# Patient Record
Sex: MALE | Race: WHITE | NOT HISPANIC OR LATINO | Employment: FULL TIME | URBAN - METROPOLITAN AREA
[De-identification: names, ages, dates, MRNs, and addresses within clinical notes are randomized per-mention and may not be internally consistent; named-entity substitution may affect disease eponyms.]

---

## 2017-08-01 ENCOUNTER — GENERIC CONVERSION - ENCOUNTER (OUTPATIENT)
Dept: OTHER | Facility: OTHER | Age: 52
End: 2017-08-01

## 2017-08-01 DIAGNOSIS — Z12.5 ENCOUNTER FOR SCREENING FOR MALIGNANT NEOPLASM OF PROSTATE: ICD-10-CM

## 2017-08-01 DIAGNOSIS — E78.5 HYPERLIPIDEMIA: ICD-10-CM

## 2017-08-01 DIAGNOSIS — Z11.59 ENCOUNTER FOR SCREENING FOR OTHER VIRAL DISEASES: ICD-10-CM

## 2017-08-01 DIAGNOSIS — Z12.11 ENCOUNTER FOR SCREENING FOR MALIGNANT NEOPLASM OF COLON: ICD-10-CM

## 2017-08-28 ENCOUNTER — LAB CONVERSION - ENCOUNTER (OUTPATIENT)
Dept: OTHER | Facility: OTHER | Age: 52
End: 2017-08-28

## 2017-08-28 ENCOUNTER — GENERIC CONVERSION - ENCOUNTER (OUTPATIENT)
Dept: OTHER | Facility: OTHER | Age: 52
End: 2017-08-28

## 2017-08-28 LAB
A/G RATIO (HISTORICAL): 2.2 (CALC) (ref 1–2.5)
ALBUMIN SERPL BCP-MCNC: 4.6 G/DL (ref 3.6–5.1)
ALP SERPL-CCNC: 81 U/L (ref 40–115)
ALT SERPL W P-5'-P-CCNC: 13 U/L (ref 9–46)
AST SERPL W P-5'-P-CCNC: 14 U/L (ref 10–35)
BASOPHILS # BLD AUTO: 0.8 %
BASOPHILS # BLD AUTO: 59 CELLS/UL (ref 0–200)
BILIRUB SERPL-MCNC: 1 MG/DL (ref 0.2–1.2)
BUN SERPL-MCNC: 17 MG/DL (ref 7–25)
BUN/CREA RATIO (HISTORICAL): NORMAL (CALC) (ref 6–22)
CALCIUM SERPL-MCNC: 9.6 MG/DL (ref 8.6–10.3)
CHLORIDE SERPL-SCNC: 105 MMOL/L (ref 98–110)
CHOLEST SERPL-MCNC: 191 MG/DL
CHOLEST/HDLC SERPL: 5.5 (CALC)
CO2 SERPL-SCNC: 25 MMOL/L (ref 20–31)
CREAT SERPL-MCNC: 0.84 MG/DL (ref 0.7–1.33)
DEPRECATED RDW RBC AUTO: 17.7 % (ref 11–15)
EGFR AFRICAN AMERICAN (HISTORICAL): 117 ML/MIN/1.73M2
EGFR-AMERICAN CALC (HISTORICAL): 101 ML/MIN/1.73M2
EOSINOPHIL # BLD AUTO: 222 CELLS/UL (ref 15–500)
EOSINOPHIL # BLD AUTO: 3 %
GAMMA GLOBULIN (HISTORICAL): 2.1 G/DL (CALC) (ref 1.9–3.7)
GLUCOSE (HISTORICAL): 97 MG/DL (ref 65–99)
HCT VFR BLD AUTO: 40.9 % (ref 38.5–50)
HDLC SERPL-MCNC: 35 MG/DL
HEPATITIS C ANTIBODY (HISTORICAL): NORMAL
HGB BLD-MCNC: 12.1 G/DL (ref 13.2–17.1)
LDL CHOLESTEROL (HISTORICAL): 136 MG/DL (CALC)
LYMPHOCYTES # BLD AUTO: 1806 CELLS/UL (ref 850–3900)
LYMPHOCYTES # BLD AUTO: 24.4 %
MCH RBC QN AUTO: 19.7 PG (ref 27–33)
MCHC RBC AUTO-ENTMCNC: 29.6 G/DL (ref 32–36)
MCV RBC AUTO: 66.6 FL (ref 80–100)
MONOCYTES # BLD AUTO: 570 CELLS/UL (ref 200–950)
MONOCYTES (HISTORICAL): 7.7 %
NEUTROPHILS # BLD AUTO: 4743 CELLS/UL (ref 1500–7800)
NEUTROPHILS # BLD AUTO: 64.1 %
NON-HDL-CHOL (CHOL-HDL) (HISTORICAL): 156 MG/DL (CALC)
PLATELET # BLD AUTO: 361 THOUSAND/UL (ref 140–400)
PMV BLD AUTO: 12.5 FL (ref 7.5–12.5)
POTASSIUM SERPL-SCNC: 4.6 MMOL/L (ref 3.5–5.3)
PROSTATE SPECIFIC ANTIGEN TOTAL (HISTORICAL): 1.6 NG/ML
RBC # BLD AUTO: 6.14 MILLION/UL (ref 4.2–5.8)
RBC MORPHOLOGY (HISTORICAL): ABNORMAL
SIGNAL TO CUT-OFF (HISTORICAL): 0.01
SODIUM SERPL-SCNC: 140 MMOL/L (ref 135–146)
TOTAL PROTEIN (HISTORICAL): 6.7 G/DL (ref 6.1–8.1)
TRIGL SERPL-MCNC: 95 MG/DL
TSH SERPL DL<=0.05 MIU/L-ACNC: 1.73 MIU/L (ref 0.4–4.5)
WBC # BLD AUTO: 7.4 THOUSAND/UL (ref 3.8–10.8)

## 2017-09-01 ENCOUNTER — ALLSCRIPTS OFFICE VISIT (OUTPATIENT)
Dept: OTHER | Facility: OTHER | Age: 52
End: 2017-09-01

## 2018-01-14 NOTE — RESULT NOTES
Discussion/Summary   will review at upcoming appt, hemoglobin slightly low, but better than in past     Verified Results  (1) CBC/PLT/DIFF [id number] 08:07AM Michelle Keen     Test Name Result Flag Reference   WHITE BLOOD CELL COUNT 7 4 Thousand/uL  3 8-10 8   RED BLOOD CELL COUNT 6 14 Million/uL H 4 20-5 80   HEMOGLOBIN 12 1 g/dL L 13 2-17 1   HEMATOCRIT 40 9 %  38 5-50 0   MCV 66 6 fL L 80 0-100 0   MCH 19 7 pg L 27 0-33 0   MCHC 29 6 g/dL L 32 0-36 0   RDW 17 7 % H 11 0-15 0   PLATELET COUNT 343 Thousand/uL  140-400   ABSOLUTE NEUTROPHILS 4743 cells/uL  4454-1607   ABSOLUTE LYMPHOCYTES 1806 cells/uL  850-3900   ABSOLUTE MONOCYTES 570 cells/uL  200-950   ABSOLUTE EOSINOPHILS 222 cells/uL     ABSOLUTE BASOPHILS 59 cells/uL  0-200   NEUTROPHILS 64 1 %     LYMPHOCYTES 24 4 %     MONOCYTES 7 7 %     EOSINOPHILS 3 0 %     BASOPHILS 0 8 %     CBC MORPHOLOGY   NORMAL   Microcytosis 2 +  Hypochromasia 1 +   MPV 12 5 fL  7 5-12 5     (1) COMPREHENSIVE METABOLIC PANEL [id number] 42:99ER Michelle Keen     Test Name Result Flag Reference   GLUCOSE 97 mg/dL  65-99   Fasting reference interval   UREA NITROGEN (BUN) 17 mg/dL  7-25   CREATININE 0 84 mg/dL  0 70-1 33   For patients >52years of age, the reference limit  for Creatinine is approximately 13% higher for people  identified as -American  eGFR NON-AFR   AMERICAN 101 mL/min/1 73m2  > OR = 60   eGFR AFRICAN AMERICAN 117 mL/min/1 73m2  > OR = 60   BUN/CREATININE RATIO   9-42   NOT APPLICABLE (calc)   SODIUM 140 mmol/L  135-146   POTASSIUM 4 6 mmol/L  3 5-5 3   CHLORIDE 105 mmol/L     CARBON DIOXIDE 25 mmol/L  20-31   CALCIUM 9 6 mg/dL  8 6-10 3   PROTEIN, TOTAL 6 7 g/dL  6 1-8 1   ALBUMIN 4 6 g/dL  3 6-5 1   GLOBULIN 2 1 g/dL (calc)  1 9-3 7   ALBUMIN/GLOBULIN RATIO 2 2 (calc)  1 0-2 5   BILIRUBIN, TOTAL 1 0 mg/dL  0 2-1 2   ALKALINE PHOSPHATASE 81 U/L     AST 14 U/L  10-35   ALT 13 U/L  9-46     (1) LIPID PANEL, FASTING [id number] 08:07AM Piper Malcolm     Test Name Result Flag Reference   CHOLESTEROL, TOTAL 191 mg/dL  <200   HDL CHOLESTEROL 35 mg/dL L >59   TRIGLICERIDES 95 mg/dL  <222   LDL-CHOLESTEROL 136 mg/dL (calc) H    Reference range: <100     Desirable range <100 mg/dL for patients with CHD or  diabetes and <70 mg/dL for diabetic patients with  known heart disease  The Pattison-Kerr calculation is a validated novel   method that provides better accuracy than the   Friedewald equation in the estimation of LDL-C,   particularly when TG levels are 150-400 mg/dL and   LDL-C levels are lower than 70 mg/dL  Reference:  Mary Ann Choudhury et al  Comparison of a Novel   Method vs the Friedewald Equation for Estimating   Low-Density Lipoprotein Cholesterol Levels From the   Standard Lipid Profile  GERRI  4376;045(29): 8619-1679  For additional information, please refer to  [email or web address]  (This link is being provided for informational/  educational purposes only )   CHOL/HDLC RATIO 5 5 (calc) H <5 0   NON HDL CHOLESTEROL 156 mg/dL (calc) H <130   For patients with diabetes plus 1 major ASCVD risk   factor, treating to a non-HDL-C goal of <100 mg/dL   (LDL-C of <70 mg/dL) is considered a therapeutic   option  (1) PSA (SCREEN) (Dx V76 44 Screen for Prostate Cancer) [id number] 08:07AM Piper Malcolm   REPORT COMMENT:  FASTING:YES  COLLECTION KIT GIVEN TO PATIENT  PATIENT ADVISED TO RETURN  Test Name Result Flag Reference   PSA, TOTAL 1 6 ng/mL  < OR = 4 0   The total PSA value from this assay system is   standardized against the WHO standard  The test   result will be approximately 20% lower when compared   to the equimolar-standardized total PSA (Mac   Minna)  Comparison of serial PSA results should be   interpreted with this fact in mind  This test was performed using the Siemens   chemiluminescent method  Values obtained from   different assay methods cannot be used  interchangeably   PSA levels, regardless of  value, should not be interpreted as absolute  evidence of the presence or absence of disease       (Q) HEPATITIS C ANTIBODY [id number] 08:07AM [facility] Smoker     Test Name Result Flag Reference   HEPATITIS C ANTIBODY NON-REACTIVE  NON-REACTIVE   SIGNAL TO CUT-OFF 0 01  <1 00     (Q) TSH, 3RD GENERATION [id number] 08:07AM [facility] Smoker     Test Name Result Flag Reference   TSH 1 73 mIU/L  0 40-4 50 Teddy

## 2018-01-15 NOTE — PROGRESS NOTES
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)   2  Encounter for preventive health examination (V70 0) (Z00 00)    Plan  Encounter for prostate cancer screening    · (1) PSA (SCREEN) (Dx V76 44 Screen for Prostate Cancer); Status:Active; Requested  for:[id number];   Hyperlipidemia    · (1) CBC/PLT/DIFF; Status:Active; Requested for:24Bmb0974;    · (1) COMPREHENSIVE METABOLIC PANEL; Status:Active; Requested for:60Wwe1658;    · (1) LIPID PANEL, FASTING; Status:Active; Requested for:[id number];    · (1) TSH; Status:Active; Requested for:52Kmv1048;   Screen for colon cancer    · *1 -  [facility]  *  Status:  Active  Requested for: 78Blh1770  Care Summary provided  : Yes    Discussion/Summary  health maintenance visit Impression: healthy adult male  Currently, he eats a healthy diet and has an adequate exercise regimen  Prostate cancer screening: the risks and benefits of prostate cancer screening were discussed and MARIANO done today  Testicular cancer screening: the risks and benefits of testicular cancer screening were discussed and clinical testicular exam was done today  Colorectal cancer screening: the risks and benefits of colorectal cancer screening were discussed and colonoscopy has been ordered  The risks and benefits of immunizations were discussed  Advice and education were given regarding nutrition, self skin examination, aerobic exercise, helmet use, weight bearing exercise, seat belt use and sunscreen use  History of Present Illness  HM, Adult Male: The patient is being seen for a health maintenance evaluation  General Health: The patient's health since the last visit is described as good  He has regular dental visits  He denies vision problems  He denies hearing loss  Lifestyle:  He consumes a diverse and healthy diet  He exercises regularly  He does not use tobacco  He consumes alcohol  Reproductive health:  the patient is sexually active   He denies erectile dysfunction  Screening:      Review of Systems    Constitutional: no fever, no chills and not feeling tired  Cardiovascular: the heart rate was not slow, no chest pain, the heart rate was not fast, no palpitations and no extremity edema  Respiratory: no shortness of breath, no cough, no wheezing and no shortness of breath during exertion  Gastrointestinal: no constipation and no diarrhea  Genitourinary: no dysuria  Psychiatric: no anxiety and no depression  Active Problems    1  Abnormal blood chemistry (790 6) (R79 9)   2  Anemia (285 9) (D64 9)   3  Encounter for prostate cancer screening (V76 44) (Z12 5)   4  Hyperlipidemia (272 4) (E78 5)   5  Myalgia And Myositis (729 1)   6  Need for hepatitis C screening test (V73 89) (Z11 59)   7  Nocturia (788 43) (R35 1)   8  Pigmented nevus (216 9) (D22 9)   9  Screen for colon cancer (V76 51) (Z12 11)   10  Shingles (053 9) (B02 9)    Surgical History    · History of Appendectomy   · History of Tonsillectomy With Adenoidectomy    Family History  Mother    · Family history of Hyperlipidemia   · Family history of Hypertension (V17 49)  Father    · Family history of Hyperlipidemia    Social History    · Being A Social Drinker   · Denied: History of Drug Use   · Former smoker (V15 82) (B46 047)    Current Meds   1  Aspirin 81 MG TABS; TAKE 1 TABLET DAILY; Therapy: (Recorded:01Jan2013) to Recorded   2  Buffered Vitamin C CAPS; Therapy: (Recorded:01Jan2013) to Recorded   3  Daily Multiple Vitamins Oral Tablet; TAKE 1 TABLET DAILY; Therapy: (Recorded:01Jan2013) to Recorded   4  Fish Oil 1000 MG Oral Capsule; TAKE 1 CAPSULE DAILY; Therapy: (Recorded:01Jan2013) to Recorded   5  Glucosamine Chondroitin Complx Oral Tablet; TAKE 1 TABLET DAILY AS DIRECTED; Therapy: [id number] to Recorded   6  Vitamin B Complex CAPS; Therapy: (Recorded:01Jan2013) to Recorded    Allergies    1  No Known Drug Allergies    2  No Known Environmental Allergies   3   No Known Food Allergies    Vitals   Recorded: 38Ljj4455 08:07AM   Heart Rate 70   Respiration 14   Systolic 287   Diastolic 86   Height 5 ft 7 5 in   Weight 208 lb    BMI Calculated 32 1   BSA Calculated 2 07     Physical Exam    Constitutional   General appearance: No acute distress, well appearing and well nourished  Head and Face   Head and face: Normal     Eyes   Conjunctiva and lids: No erythema, swelling or discharge  Ears, Nose, Mouth, and Throat   External inspection of ears and nose: Normal     Neck   Neck: Supple, symmetric, trachea midline, no masses  Thyroid: Normal, no thyromegaly  Pulmonary   Respiratory effort: No increased work of breathing or signs of respiratory distress  Cardiovascular   Auscultation of heart: Normal rate and rhythm, normal S1 and S2, no murmurs  Carotid pulses: 2+ bilaterally  Abdomen   Abdomen: Non-tender, no masses  Liver and spleen: No hepatomegaly or splenomegaly  Examination for hernias: No hernias appreciated  Anus, perineum, and rectum: Normal sphincter tone, no masses, no prolapse  Genitourinary   Scrotal contents: Normal testes, no masses  Penis: Normal, no lesions  Digital rectal exam of prostate: Normal size, no masses  Lymphatic   Palpation of lymph nodes in neck: No lymphadenopathy  Musculoskeletal   Gait and station: Normal     Neurologic   Cortical function: Normal mental status  Psychiatric   Judgment and insight: Normal     Orientation to person, place and time: Normal     Recent and remote memory: Intact      Mood and affect: Normal        Signatures   Electronically signed by : ANIBAL Garcia ; Sep  1 2017  8:20AM EST                       (Author)

## 2018-01-22 VITALS
SYSTOLIC BLOOD PRESSURE: 136 MMHG | WEIGHT: 208 LBS | HEART RATE: 70 BPM | RESPIRATION RATE: 14 BRPM | BODY MASS INDEX: 31.52 KG/M2 | DIASTOLIC BLOOD PRESSURE: 86 MMHG | HEIGHT: 68 IN

## 2018-08-27 DIAGNOSIS — Z12.5 ENCOUNTER FOR SCREENING FOR MALIGNANT NEOPLASM OF PROSTATE: ICD-10-CM

## 2018-08-27 DIAGNOSIS — E78.5 HYPERLIPIDEMIA: ICD-10-CM

## 2018-08-30 LAB
ALBUMIN SERPL-MCNC: 4.3 G/DL (ref 3.6–5.1)
ALBUMIN/GLOB SERPL: 2 (CALC) (ref 1–2.5)
ALP SERPL-CCNC: 80 U/L (ref 40–115)
ALT SERPL-CCNC: 14 U/L (ref 9–46)
AST SERPL-CCNC: 16 U/L (ref 10–35)
BASOPHILS # BLD AUTO: 78 CELLS/UL (ref 0–200)
BASOPHILS NFR BLD AUTO: 1.2 %
BILIRUB SERPL-MCNC: 1.1 MG/DL (ref 0.2–1.2)
BUN SERPL-MCNC: 16 MG/DL (ref 7–25)
BUN/CREAT SERPL: NORMAL (CALC) (ref 6–22)
CALCIUM SERPL-MCNC: 9.3 MG/DL (ref 8.6–10.3)
CHLORIDE SERPL-SCNC: 105 MMOL/L (ref 98–110)
CHOLEST SERPL-MCNC: 192 MG/DL
CHOLEST/HDLC SERPL: 4.9 (CALC)
CO2 SERPL-SCNC: 26 MMOL/L (ref 20–32)
CREAT SERPL-MCNC: 0.85 MG/DL (ref 0.7–1.33)
EOSINOPHIL # BLD AUTO: 241 CELLS/UL (ref 15–500)
EOSINOPHIL NFR BLD AUTO: 3.7 %
ERYTHROCYTE [DISTWIDTH] IN BLOOD BY AUTOMATED COUNT: 17.7 % (ref 11–15)
GLOBULIN SER CALC-MCNC: 2.2 G/DL (CALC) (ref 1.9–3.7)
GLUCOSE SERPL-MCNC: 96 MG/DL (ref 65–99)
HCT VFR BLD AUTO: 38.8 % (ref 38.5–50)
HDLC SERPL-MCNC: 39 MG/DL
HGB BLD-MCNC: 11.7 G/DL (ref 13.2–17.1)
LDLC SERPL CALC-MCNC: 133 MG/DL (CALC)
LYMPHOCYTES # BLD AUTO: 1846 CELLS/UL (ref 850–3900)
LYMPHOCYTES NFR BLD AUTO: 28.4 %
MCH RBC QN AUTO: 20.2 PG (ref 27–33)
MCHC RBC AUTO-ENTMCNC: 30.2 G/DL (ref 32–36)
MCV RBC AUTO: 67 FL (ref 80–100)
MONOCYTES # BLD AUTO: 429 CELLS/UL (ref 200–950)
MONOCYTES NFR BLD AUTO: 6.6 %
NEUTROPHILS # BLD AUTO: 3907 CELLS/UL (ref 1500–7800)
NEUTROPHILS NFR BLD AUTO: 60.1 %
NONHDLC SERPL-MCNC: 153 MG/DL (CALC)
PLATELET # BLD AUTO: 365 THOUSAND/UL (ref 140–400)
PMV BLD REES-ECKER: ABNORMAL FL
POTASSIUM SERPL-SCNC: 4.3 MMOL/L (ref 3.5–5.3)
PROT SERPL-MCNC: 6.5 G/DL (ref 6.1–8.1)
PSA SERPL-MCNC: 1.5 NG/ML
RBC # BLD AUTO: 5.79 MILLION/UL (ref 4.2–5.8)
SERVICE CMNT-IMP: ABNORMAL
SL AMB EGFR AFRICAN AMERICAN: 115 ML/MIN/1.73M2
SL AMB EGFR NON AFRICAN AMERICAN: 99 ML/MIN/1.73M2
SODIUM SERPL-SCNC: 138 MMOL/L (ref 135–146)
TRIGL SERPL-MCNC: 92 MG/DL
TSH SERPL-ACNC: 2.17 MIU/L (ref 0.4–4.5)
WBC # BLD AUTO: 6.5 THOUSAND/UL (ref 3.8–10.8)

## 2018-09-05 ENCOUNTER — OFFICE VISIT (OUTPATIENT)
Dept: INTERNAL MEDICINE CLINIC | Facility: CLINIC | Age: 53
End: 2018-09-05
Payer: COMMERCIAL

## 2018-09-05 VITALS
DIASTOLIC BLOOD PRESSURE: 84 MMHG | HEART RATE: 55 BPM | BODY MASS INDEX: 30.66 KG/M2 | HEIGHT: 69 IN | SYSTOLIC BLOOD PRESSURE: 130 MMHG | WEIGHT: 207 LBS | TEMPERATURE: 98.4 F | OXYGEN SATURATION: 98 %

## 2018-09-05 DIAGNOSIS — D56.9 THALASSEMIA, UNSPECIFIED TYPE: ICD-10-CM

## 2018-09-05 DIAGNOSIS — Z00.00 ENCOUNTER FOR WELLNESS EXAMINATION: Primary | ICD-10-CM

## 2018-09-05 DIAGNOSIS — Z23 NEED FOR SHINGLES VACCINE: ICD-10-CM

## 2018-09-05 DIAGNOSIS — Z12.11 SCREEN FOR COLON CANCER: ICD-10-CM

## 2018-09-05 PROCEDURE — 99396 PREV VISIT EST AGE 40-64: CPT | Performed by: INTERNAL MEDICINE

## 2018-09-05 RX ORDER — B-COMPLEX WITH VITAMIN C
TABLET ORAL
COMMUNITY
End: 2018-09-05 | Stop reason: ALTCHOICE

## 2018-09-05 RX ORDER — MULTIVITAMIN/IRON/FOLIC ACID 18MG-0.4MG
1 TABLET ORAL DAILY
COMMUNITY
Start: 2012-12-27

## 2018-09-05 RX ORDER — CHLORAL HYDRATE 500 MG
1 CAPSULE ORAL DAILY
COMMUNITY

## 2018-09-05 NOTE — PROGRESS NOTES
NAME: Ivette Abad  AGE: 48 y o  SEX: male  : 1965     DATE: 2018    Assessment and Plan     Problem List Items Addressed This Visit     Reuben Ledezma      Other Visit Diagnoses     Encounter for wellness examination    -  Primary     discussed preventative health, cancer screening, immunizations, and safety issues  Screen for colon cancer        Relevant Orders    Ambulatory referral to Colorectal Surgery    Need for shingles vaccine        Relevant Medications    Zoster Vac Recomb Adjuvanted ([address]) 50 MCG SUSR            · Patient Counseling:   · Nutrition: Stressed importance of a well balanced diet, moderation of sodium/saturated fat, caloric balance and sufficient intake of fiber  · Exercise: Stressed the importance of regular exercise with a goal of 150 minutes per week  · Dental Health: Discussed daily flossing and brushing and regular dental visits   · Injury Prevention: Discussed Safety Belts, Safety Helmets, and Smoke Detectors    · Immunizations reviewed and  Yearly flu shot recommended along with the new shingles shot  · Discussed benefits of screening   Including screening colonoscopy  · Discussed the patient's BMI with him  The BMI is above average; BMI management plan is completed     Return in about 1 year (around 2019)  Chief Complaint     Chief Complaint   Patient presents with    Annual Exam       History of Present Illness       Patient here for wellness exam, he feels well        Well Adult Physical   Patient here for a comprehensive physical exam       Diet and Physical Activity  Diet: well balanced diet and low carbohydrate diet  Weight concerns: Patient is overweight (BMI 25 0-29  9)  Exercise: frequently      Depression Screen  PHQ-9 Depression Screening    PHQ-9:    Frequency of the following problems over the past two weeks:       Little interest or pleasure in doing things:  0 - not at all  Feeling down, depressed, or hopeless:  0 - not at all  PHQ-2 Score:  0          General Health  Hearing: Normal:  bilateral  Vision: no vision problems  Dental: regular dental visits    Reproductive Health  No ED      The following portions of the patient's history were reviewed and updated as appropriate: allergies, current medications, past family history, past medical history, past social history, past surgical history and problem list     Review of Systems     Review of Systems   Constitutional: Negative for chills, fatigue and fever  HENT: Negative for congestion, nosebleeds, postnasal drip, sore throat and trouble swallowing  Eyes: Negative for pain  Respiratory: Negative for cough, chest tightness, shortness of breath and wheezing  Cardiovascular: Negative for chest pain, palpitations and leg swelling  Gastrointestinal: Negative for abdominal pain, constipation, diarrhea, nausea and vomiting  Endocrine: Negative for polydipsia and polyuria  Genitourinary: Negative for dysuria, flank pain and hematuria  Musculoskeletal: Negative for arthralgias  Skin: Negative for rash  Neurological: Negative for dizziness, tremors and headaches  Hematological: Does not bruise/bleed easily  Psychiatric/Behavioral: Negative for confusion and dysphoric mood  The patient is not nervous/anxious          Past Medical History     Past Medical History:   Diagnosis Date    Medical history unknown        Past Surgical History     Past Surgical History:   Procedure Laterality Date    APPENDECTOMY      TONSILLECTOMY AND ADENOIDECTOMY         Social History     Social History     Social History    Marital status: /Civil Union     Spouse name: N/A    Number of children: N/A    Years of education: N/A     Social History Main Topics    Smoking status: Not on file    Smokeless tobacco: Not on file    Alcohol use Yes      Comment: Being A Social Drinker     Drug use: No    Sexual activity: Not on file     Other Topics Concern    Not on file     Social History Narrative    No narrative on file       Family History     Family History   Problem Relation Age of Onset    Hyperlipidemia Mother     Hypertension Mother     Hyperlipidemia Father        Current Medications       Current Outpatient Prescriptions:     aspirin 81 MG tablet, Take 1 tablet by mouth daily, Disp: , Rfl:     Calcium Carbonate (CALTRATE 600 PO), Take by mouth, Disp: , Rfl:     DAILY MULTIPLE VITAMINS/IRON PO, Take 1 tablet by mouth daily, Disp: , Rfl:     Glucosamine-Chondroitin 0139-5053 MG/30ML LIQD, Take 1 tablet by mouth daily, Disp: , Rfl:     Omega-3 Fatty Acids (FISH OIL) 1,000 mg, Take 1 capsule by mouth daily, Disp: , Rfl:     Zoster Vac Recomb Adjuvanted (SHINGRIX) 50 MCG SUSR, Inject 50 mcg into a muscle once for 1 dose Repeat one dose in 2 to 6 months, Disp: 1 each, Rfl: 0     Allergies     No Known Allergies    Objective     /84   Pulse 55   Temp 98 4 °F (36 9 °C)   Ht 5' 9" (1 753 m)   Wt 93 9 kg (207 lb)   SpO2 98%   BMI 30 57 kg/m²      Physical Exam   Constitutional: He is oriented to person, place, and time  He appears well-developed and well-nourished  No distress  HENT:   Head: Normocephalic and atraumatic  Right Ear: External ear normal    Left Ear: External ear normal    Eyes: Conjunctivae are normal  No scleral icterus  Neck: Normal range of motion  Neck supple  No tracheal deviation present  No thyromegaly present  Cardiovascular: Normal rate, regular rhythm and normal heart sounds  No murmur heard  Pulmonary/Chest: Effort normal and breath sounds normal  No respiratory distress  He has no wheezes  He has no rales  Abdominal: Soft  Bowel sounds are normal  There is no tenderness  There is no rebound and no guarding  A hernia is present  Hernia confirmed positive in the right inguinal area ( mild)  Hernia confirmed negative in the left inguinal area     Genitourinary: Rectum normal, prostate normal and penis normal  Right testis shows no mass and no tenderness  Left testis shows no mass and no tenderness  Musculoskeletal: He exhibits no edema  Lymphadenopathy:     He has no cervical adenopathy  Neurological: He is alert and oriented to person, place, and time  Psychiatric: He has a normal mood and affect  His behavior is normal  Judgment and thought content normal    Vitals reviewed  No exam data present    Health Maintenance     Health Maintenance   Topic Date Due    CRC Screening: Colonoscopy  1965    DTaP,Tdap,and Td Vaccines (1 - Tdap) 01/31/1986    INFLUENZA VACCINE  10/05/2018 (Originally 9/1/2018)    Depression Screening PHQ  09/05/2019       There is no immunization history on file for this patient      Lexi Perry MD  [facility]

## 2019-08-09 ENCOUNTER — TELEPHONE (OUTPATIENT)
Dept: GASTROENTEROLOGY | Facility: MEDICAL CENTER | Age: 54
End: 2019-08-09

## 2019-08-09 ENCOUNTER — TELEPHONE (OUTPATIENT)
Dept: INTERNAL MEDICINE CLINIC | Facility: CLINIC | Age: 54
End: 2019-08-09

## 2019-08-09 DIAGNOSIS — Z12.5 SCREENING FOR PROSTATE CANCER: ICD-10-CM

## 2019-08-09 DIAGNOSIS — E78.00 HYPERCHOLESTEREMIA: Primary | ICD-10-CM

## 2019-08-09 DIAGNOSIS — E55.9 VITAMIN D DEFICIENCY: ICD-10-CM

## 2019-08-09 NOTE — TELEPHONE ENCOUNTER
Pt's wife called in requesting orders for labs to be done prior to his appt - also wants vitamin d, once entered please mail

## 2019-08-09 NOTE — TELEPHONE ENCOUNTER
08/09/19  Screened by: Elosie Mckeon    Referring Provider Dr Girish Short: There is no height or weight on file to calculate BMI  Has patient been referred for a routine screening Colonoscopy? yes  Is the patient between 39-70 years old? yes    SCHEDULING STAFF   If the patient is between 45yrs-49yrs, please advise patient to confirm benefits/coverage with their insurance company for a routine screening colonoscopy, some insurance carriers will only cover at [address] or older   If the patient is over 66years old, please schedule an office visit  Do you have any of the following symptoms?  no    Have you had a coronary or vascular stent within the last year? no    Have you had a heart attack or stroke in the last 6 months? no    Have you had intestinal surgery in the last 3 months? no    Do you have problems with: no    Do you use: no    Have you been hospitalized in the last Month? no    Have you been diagnosed with a bleeding disorder or anemia? no    Have you had chest pain (angina) or breathing problems  (COPD) in the last 3 months? no    Do you have any difficulty walking up a flight of stairs? no    Have you had Kidney failure or insufficiency? no    Have you had heart valve surgery? no    Are you confined to a wheelchair? no    Do you take no    Have you been diagnosed with Diabetes or are you taking any   Diabetic medications? no    : If patient answers NO to medical questions, then schedule procedure  If patient answers YES to medical questions, then schedule office appointment  Previous Colonoscopy no  Date and Facility of last colonoscopy? Comments: patient passed OA and would like to be scheduled in [facility]   Patient can be reached at [phone]

## 2019-08-12 ENCOUNTER — TELEPHONE (OUTPATIENT)
Dept: GASTROENTEROLOGY | Facility: AMBULARY SURGERY CENTER | Age: 54
End: 2019-08-12

## 2019-08-31 LAB
25(OH)D3 SERPL-MCNC: 43 NG/ML (ref 30–100)
ALBUMIN SERPL-MCNC: 4.4 G/DL (ref 3.6–5.1)
ALBUMIN/GLOB SERPL: 2 (CALC) (ref 1–2.5)
ALP SERPL-CCNC: 72 U/L (ref 40–115)
ALT SERPL-CCNC: 15 U/L (ref 9–46)
AST SERPL-CCNC: 15 U/L (ref 10–35)
BASOPHILS # BLD AUTO: 69 CELLS/UL (ref 0–200)
BASOPHILS NFR BLD AUTO: 1.1 %
BILIRUB SERPL-MCNC: 1 MG/DL (ref 0.2–1.2)
BUN SERPL-MCNC: 15 MG/DL (ref 7–25)
BUN/CREAT SERPL: NORMAL (CALC) (ref 6–22)
CALCIUM SERPL-MCNC: 9.7 MG/DL (ref 8.6–10.3)
CHLORIDE SERPL-SCNC: 103 MMOL/L (ref 98–110)
CHOLEST SERPL-MCNC: 188 MG/DL
CHOLEST/HDLC SERPL: 4.8 (CALC)
CO2 SERPL-SCNC: 28 MMOL/L (ref 20–32)
CREAT SERPL-MCNC: 0.99 MG/DL (ref 0.7–1.33)
EOSINOPHIL # BLD AUTO: 208 CELLS/UL (ref 15–500)
EOSINOPHIL NFR BLD AUTO: 3.3 %
ERYTHROCYTE [DISTWIDTH] IN BLOOD BY AUTOMATED COUNT: 16.3 % (ref 11–15)
GLOBULIN SER CALC-MCNC: 2.2 G/DL (CALC) (ref 1.9–3.7)
GLUCOSE SERPL-MCNC: 98 MG/DL (ref 65–99)
HCT VFR BLD AUTO: 38.4 % (ref 38.5–50)
HDLC SERPL-MCNC: 39 MG/DL
HGB BLD-MCNC: 11.8 G/DL (ref 13.2–17.1)
LDLC SERPL CALC-MCNC: 127 MG/DL (CALC)
LYMPHOCYTES # BLD AUTO: 1909 CELLS/UL (ref 850–3900)
LYMPHOCYTES NFR BLD AUTO: 30.3 %
MCH RBC QN AUTO: 20.7 PG (ref 27–33)
MCHC RBC AUTO-ENTMCNC: 30.7 G/DL (ref 32–36)
MCV RBC AUTO: 67.5 FL (ref 80–100)
MONOCYTES # BLD AUTO: 649 CELLS/UL (ref 200–950)
MONOCYTES NFR BLD AUTO: 10.3 %
NEUTROPHILS # BLD AUTO: 3465 CELLS/UL (ref 1500–7800)
NEUTROPHILS NFR BLD AUTO: 55 %
NONHDLC SERPL-MCNC: 149 MG/DL (CALC)
PLATELET # BLD AUTO: 334 THOUSAND/UL (ref 140–400)
PMV BLD REES-ECKER: 11.5 FL (ref 7.5–12.5)
POTASSIUM SERPL-SCNC: 4.5 MMOL/L (ref 3.5–5.3)
PROT SERPL-MCNC: 6.6 G/DL (ref 6.1–8.1)
PSA SERPL-MCNC: 1.5 NG/ML
RBC # BLD AUTO: 5.69 MILLION/UL (ref 4.2–5.8)
SL AMB EGFR AFRICAN AMERICAN: 100 ML/MIN/1.73M2
SL AMB EGFR NON AFRICAN AMERICAN: 86 ML/MIN/1.73M2
SODIUM SERPL-SCNC: 137 MMOL/L (ref 135–146)
TRIGL SERPL-MCNC: 112 MG/DL
WBC # BLD AUTO: 6.3 THOUSAND/UL (ref 3.8–10.8)

## 2019-09-06 ENCOUNTER — OFFICE VISIT (OUTPATIENT)
Dept: INTERNAL MEDICINE CLINIC | Facility: CLINIC | Age: 54
End: 2019-09-06
Payer: COMMERCIAL

## 2019-09-06 VITALS
TEMPERATURE: 98.3 F | BODY MASS INDEX: 31.55 KG/M2 | DIASTOLIC BLOOD PRESSURE: 78 MMHG | WEIGHT: 213 LBS | OXYGEN SATURATION: 96 % | HEIGHT: 69 IN | HEART RATE: 62 BPM | SYSTOLIC BLOOD PRESSURE: 112 MMHG

## 2019-09-06 DIAGNOSIS — Z00.00 WELLNESS EXAMINATION: Primary | ICD-10-CM

## 2019-09-06 DIAGNOSIS — D56.9 THALASSEMIA, UNSPECIFIED TYPE: ICD-10-CM

## 2019-09-06 DIAGNOSIS — Z23 NEED FOR DIPHTHERIA-TETANUS-PERTUSSIS (TDAP) VACCINE: ICD-10-CM

## 2019-09-06 PROCEDURE — 99396 PREV VISIT EST AGE 40-64: CPT | Performed by: INTERNAL MEDICINE

## 2019-09-06 PROCEDURE — 90471 IMMUNIZATION ADMIN: CPT

## 2019-09-06 PROCEDURE — 90715 TDAP VACCINE 7 YRS/> IM: CPT

## 2019-09-06 RX ORDER — VITAMIN B COMPLEX
1 CAPSULE ORAL DAILY
COMMUNITY

## 2019-09-06 NOTE — PROGRESS NOTES
Assessment/Plan:    Thalassanemia  Stable    Wellness examination  Discussed preventative health, cancer screening, immunizations, and safety issues  Patient reminded about need to get colonoscopy, he states he wants to wait till age 54, but may get it earlier down in [place] this year  I recommend Tdap vaccination  I recommend flu shot, and patient is going to get this at work  I recommend getting the Shingrix shot to help prevent Shingles  You can get it a pharmacy, and they can administer it there  It is a two shot series with the second shot needed between 2-6 months after the first shot  I would not recommend getting the shot before an important or fun event in case you were to have a reaction to the shot like a sore arm or flu-like symptoms  I make the same recommendation about any shot, as people can have a reaction to any shot  Diagnoses and all orders for this visit:    Wellness examination    Thalassemia, unspecified type    Need for diphtheria-tetanus-pertussis (Tdap) vaccine  -     TDAP VACCINE GREATER THAN OR EQUAL TO 6YO IM    Other orders  -     Cancel: Ambulatory referral to Colorectal Surgery; Future  -     Cancel: influenza vaccine, quadrivalent, 0 5 mL, preservative-free  -     Cancel: PNEUMOCOCCAL CONJUGATE VACCINE 13-VALENT GREATER THAN 6 MONTHS  -     b complex vitamins capsule; Take 1 capsule by mouth daily          Subjective:      Patient ID: Johnnie Barba is a 47 y o  male  Wellness:  No smoking cigarettes, sees a dentist regularly, exercises, eats healthy diet  The following portions of the patient's history were reviewed and updated as appropriate: allergies, current medications, past family history, past medical history, past social history, past surgical history and problem list     Review of Systems   Constitutional: Negative for chills, fatigue and fever  HENT: Negative for congestion, nosebleeds, postnasal drip, sore throat and trouble swallowing  Eyes: Negative for pain  Respiratory: Negative for cough, chest tightness, shortness of breath and wheezing  Cardiovascular: Negative for chest pain, palpitations and leg swelling  Gastrointestinal: Negative for abdominal pain, constipation, diarrhea, nausea and vomiting  Endocrine: Negative for polydipsia and polyuria  Genitourinary: Negative for dysuria, flank pain and hematuria  Musculoskeletal: Negative for arthralgias and myalgias  Skin: Negative for rash  Neurological: Negative for dizziness, tremors and headaches  Hematological: Does not bruise/bleed easily  Psychiatric/Behavioral: Negative for confusion and dysphoric mood  The patient is not nervous/anxious  Objective:      /78   Pulse 62   Temp 98 3 °F (36 8 °C)   Ht 5' 9" (1 753 m)   Wt 96 6 kg (213 lb)   SpO2 96%   BMI 31 45 kg/m²          Physical Exam   Constitutional: He is oriented to person, place, and time  He appears well-developed and well-nourished  No distress  HENT:   Head: Normocephalic and atraumatic  Right Ear: External ear normal    Left Ear: External ear normal    Eyes: Conjunctivae are normal  No scleral icterus  Neck: Normal range of motion  Neck supple  No tracheal deviation present  No thyromegaly present  Cardiovascular: Normal rate, regular rhythm and normal heart sounds  Pulmonary/Chest: Effort normal and breath sounds normal  No respiratory distress  He has no wheezes  He has no rales  Abdominal: Soft  Bowel sounds are normal  There is no tenderness  There is no rebound and no guarding  A hernia is present  Hernia confirmed positive in the right inguinal area (Very mild inguinal hernia)  Hernia confirmed negative in the left inguinal area  Genitourinary: Rectum normal, prostate normal and testes normal  Prostate is not enlarged  Right testis shows no mass and no tenderness  Left testis shows no mass and no tenderness  Musculoskeletal: He exhibits no edema  Lymphadenopathy:     He has no cervical adenopathy  Neurological: He is alert and oriented to person, place, and time  Psychiatric: He has a normal mood and affect  His behavior is normal  Judgment and thought content normal    Vitals reviewed  BMI Counseling: Body mass index is 31 45 kg/m²  Discussed the patient's BMI with him  The BMI is above average  BMI counseling and education was provided to the patient  Nutrition recommendations include reducing portion sizes, decreasing overall calorie intake, 3-5 servings of fruits/vegetables daily, reducing fast food intake, consuming healthier snacks, decreasing soda and/or juice intake, moderation in carbohydrate intake and increasing intake of lean protein  Exercise recommendations include exercising 3-5 times per week

## 2019-09-06 NOTE — ASSESSMENT & PLAN NOTE
Discussed preventative health, cancer screening, immunizations, and safety issues  Patient reminded about need to get colonoscopy, he states he wants to wait till age 54, but may get it earlier down in [place] this year  I recommend Tdap vaccination  I recommend flu shot, and patient is going to get this at work  I recommend getting the Shingrix shot to help prevent Shingles  You can get it a pharmacy, and they can administer it there  It is a two shot series with the second shot needed between 2-6 months after the first shot  I would not recommend getting the shot before an important or fun event in case you were to have a reaction to the shot like a sore arm or flu-like symptoms  I make the same recommendation about any shot, as people can have a reaction to any shot

## 2019-09-06 NOTE — PATIENT INSTRUCTIONS
Problem List Items Addressed This Visit        Other    Thalassanemia     Stable         Wellness examination - Primary     Discussed preventative health, cancer screening, immunizations, and safety issues  Patient reminded about need to get colonoscopy, he states he wants to wait till age 54, but may get it earlier down in [place] this year  I recommend Tdap vaccination  I recommend flu shot, and patient is going to get this at work  I recommend getting the Shingrix shot to help prevent Shingles  You can get it a pharmacy, and they can administer it there  It is a two shot series with the second shot needed between 2-6 months after the first shot  I would not recommend getting the shot before an important or fun event in case you were to have a reaction to the shot like a sore arm or flu-like symptoms  I make the same recommendation about any shot, as people can have a reaction to any shot             Other Visit Diagnoses     Need for diphtheria-tetanus-pertussis (Tdap) vaccine        Relevant Orders    TDAP VACCINE GREATER THAN OR EQUAL TO 8YO IM Pt presents with c/o conjunctivitis in both eyes.

## 2020-07-03 ENCOUNTER — TELEPHONE (OUTPATIENT)
Dept: INTERNAL MEDICINE CLINIC | Facility: CLINIC | Age: 55
End: 2020-07-03

## 2020-07-03 ENCOUNTER — TELEPHONE (OUTPATIENT)
Dept: OTHER | Facility: OTHER | Age: 55
End: 2020-07-03

## 2020-07-03 DIAGNOSIS — L23.7 POISON IVY DERMATITIS: Primary | ICD-10-CM

## 2020-07-03 RX ORDER — PREDNISONE 10 MG/1
TABLET ORAL
Qty: 18 TABLET | Refills: 0 | Status: SHIPPED | OUTPATIENT
Start: 2020-07-03 | End: 2020-07-10

## 2020-07-03 NOTE — TELEPHONE ENCOUNTER
Message as follow: Please call Argelia Linares @ [phone] regarding her  Torsten Camilo 1965/ Patient have poison ivy  Explain to pt   Wife if the on call do not call within 30 minutes to call back

## 2020-07-03 NOTE — TELEPHONE ENCOUNTER
Reports poison ivy  Is requesting prednisone and will call in at this time  Informed that if symptoms persist or worsen over weekend to go to urgent care

## 2020-08-08 ENCOUNTER — NURSE TRIAGE (OUTPATIENT)
Dept: OTHER | Facility: OTHER | Age: 55
End: 2020-08-08

## 2020-08-08 DIAGNOSIS — L23.7 POISON IVY DERMATITIS: Primary | ICD-10-CM

## 2020-08-08 RX ORDER — PREDNISONE 10 MG/1
TABLET ORAL
Qty: 21 TABLET | Refills: 0 | Status: SHIPPED | OUTPATIENT
Start: 2020-08-08 | End: 2020-08-17

## 2020-08-08 NOTE — TELEPHONE ENCOUNTER
As per Dr Adrian Chino he will send in steroid but he should follow up with PCP Monday if no improvement  If he notices worsening pain over the weekend then he should go to urgent care  Pt made aware and verbalized understanding

## 2020-08-08 NOTE — PROGRESS NOTES
Patient has outbreak of poison ivy dermatitis  Will send for prednisone at this time  Patient to followup with urgent care for evaluation if pain worsens over the weekend   Patient will call PCP Monday for followup if symptoms continue to persist

## 2020-08-08 NOTE — TELEPHONE ENCOUNTER
Regarding: Poison  ----- Message from Vilma Pinedo sent at 8/8/2020 10:22 AM EDT -----  Wife calling because  has poison on his arms and private part  Wife states this happens often and usually Dr Hardy Mohamud prescribes prednisone for this

## 2020-08-08 NOTE — TELEPHONE ENCOUNTER
Reason for Disposition   Severe poison ivy, oak, or sumac reaction in the past    Answer Assessment - Initial Assessment Questions  1  APPEARANCE of RASH: "Describe the rash "       It appears to be poison sumac  2  LOCATION: "Where is the rash located?"       Shin, wrist, hand, groin area, under the arm  3  SIZE: "How large is the rash?"       Medium patches  4  ONSET: "When did the rash begin?"       Started today  5  ITCHING: "Does the rash itch?" If so, ask: "How bad is it?"    - MILD - doesn't interfere with normal activities    - MODERATE-SEVERE: interferes with work, school, sleep, or other activities      The itching is moderate  He was doing yard work and was exposed  Has been having poison ivy reactions since he was a child      Protocols used: POISON IVY - OAK - SUMAC-ADULT-

## 2020-08-10 NOTE — TELEPHONE ENCOUNTER
Called patient and left message that medications was sent and he should follow up with his PCP if not improving

## 2020-08-12 ENCOUNTER — TELEMEDICINE (OUTPATIENT)
Dept: INTERNAL MEDICINE CLINIC | Facility: CLINIC | Age: 55
End: 2020-08-12
Payer: COMMERCIAL

## 2020-08-12 VITALS — HEIGHT: 69 IN | BODY MASS INDEX: 32.29 KG/M2 | WEIGHT: 218 LBS

## 2020-08-12 DIAGNOSIS — L23.7 POISON SUMAC: Primary | ICD-10-CM

## 2020-08-12 PROCEDURE — 3725F SCREEN DEPRESSION PERFORMED: CPT | Performed by: INTERNAL MEDICINE

## 2020-08-12 PROCEDURE — 1036F TOBACCO NON-USER: CPT | Performed by: INTERNAL MEDICINE

## 2020-08-12 PROCEDURE — 99214 OFFICE O/P EST MOD 30 MIN: CPT | Performed by: INTERNAL MEDICINE

## 2020-08-12 PROCEDURE — 3008F BODY MASS INDEX DOCD: CPT | Performed by: INTERNAL MEDICINE

## 2020-08-12 NOTE — PATIENT INSTRUCTIONS
Problem List Items Addressed This Visit        Musculoskeletal and Integument    Poison sumac - Primary     Patient still has areas of involvement, no signs of cellulitis  I discussed in detail with patient signs of cellulitis, what to look for, and to call me if these were to develop  He can add some topical hydrocortisone cream over-the-counter, continue with Benadryl evening, and Claritin during the day since it will make an drowsy  Discussed making sure her present is off tools, clothes, and cat    No respiratory distress or tongue swelling

## 2020-08-12 NOTE — PROGRESS NOTES
Virtual Regular Visit      Assessment/Plan:    Problem List Items Addressed This Visit        Musculoskeletal and Integument    Poison sumac - Primary     Patient still has areas of involvement, no signs of cellulitis  I discussed in detail with patient signs of cellulitis, what to look for, and to call me if these were to develop  He can add some topical hydrocortisone cream over-the-counter, continue with Benadryl evening, and Claritin during the day since it will make an drowsy  Discussed making sure her present is off tools, clothes, and cat  No respiratory distress or tongue swelling               BMI Counseling: There is no height or weight on file to calculate BMI  The BMI is above normal  Nutrition recommendations include encouraging healthy choices of fruits and vegetables and moderation in carbohydrate intake  Exercise recommendations include exercising 3-5 times per week  Reason for visit is   Chief Complaint   Patient presents with    Virtual Regular Visit        Encounter provider Rhoda Lehman MD    Provider located at [address] [place] [id number]      Recent Visits  No visits were found meeting these conditions  Showing recent visits within past 7 days and meeting all other requirements     Today's Visits  Date Type Provider Dept   08/12/20 Telemedicine Jose Hansen today's visits and meeting all other requirements     Future Appointments  No visits were found meeting these conditions  Showing future appointments within next 150 days and meeting all other requirements        The patient was identified by name and date of birth  Juana Robertsa was informed that this is a telemedicine visit and that the visit is being conducted through [facility] and patient was informed that this is a secure, HIPAA-compliant platform  He agrees to proceed     My office door was closed   No one else was in the room  He acknowledged consent and understanding of privacy and security of the video platform  The patient has agreed to participate and understands they can discontinue the visit at any time  Patient is aware this is a billable service  Subjective  Ciro Nieves is a 54 y o  male   Pt was treated with steroids with poison sumac  He reports it is continuing to "spread "  Pt did wash his clothes to make sure no resin on clothes  The involved areas itch, no pain  No swelling of the tongue, no wheezing  He has taken benadryl  Past Medical History:   Diagnosis Date    Medical history unknown        Past Surgical History:   Procedure Laterality Date    APPENDECTOMY      TONSILLECTOMY AND ADENOIDECTOMY         Current Outpatient Medications   Medication Sig Dispense Refill    b complex vitamins capsule Take 1 capsule by mouth daily      DAILY MULTIPLE VITAMINS/IRON PO Take 1 tablet by mouth daily      Glucosamine-Chondroitin 7612-8072 MG/30ML LIQD Take 1 tablet by mouth daily      Omega-3 Fatty Acids (FISH OIL) 1,000 mg Take 1 capsule by mouth daily      predniSONE 10 mg tablet Take 4 tablets (40 mg total) by mouth daily for 3 days, THEN 2 tablets (20 mg total) daily for 3 days, THEN 1 tablet (10 mg total) daily for 3 days  21 tablet 0    Calcium Carbonate (CALTRATE 600 PO) Take by mouth       No current facility-administered medications for this visit  No Known Allergies    Review of Systems   Constitutional: Negative for chills, fatigue and fever  HENT: Negative for congestion, nosebleeds, postnasal drip, sore throat and trouble swallowing  Eyes: Negative for pain  Respiratory: Negative for cough, chest tightness, shortness of breath and wheezing  Cardiovascular: Negative for chest pain, palpitations and leg swelling  Gastrointestinal: Negative for abdominal pain, constipation, diarrhea, nausea and vomiting     Endocrine: Negative for polydipsia and polyuria  Genitourinary: Negative for dysuria, flank pain and hematuria  Musculoskeletal: Negative for arthralgias  Skin: Positive for rash  Neurological: Negative for dizziness, tremors and headaches  Hematological: Does not bruise/bleed easily  Psychiatric/Behavioral: Negative for confusion and dysphoric mood  The patient is not nervous/anxious  Video Exam    Vitals:    08/12/20 0906   Weight: 98 9 kg (218 lb)   Height: 5' 9" (1 753 m)       Physical Exam  Constitutional:       General: He is not in acute distress  Appearance: He is well-developed  He is not diaphoretic  HENT:      Head: Normocephalic and atraumatic  Right Ear: External ear normal       Left Ear: External ear normal       Nose: Nose normal    Eyes:      General: No scleral icterus  Right eye: No discharge  Left eye: No discharge  Conjunctiva/sclera: Conjunctivae normal    Neck:      Musculoskeletal: Normal range of motion and neck supple  Trachea: No tracheal deviation  Pulmonary:      Effort: Pulmonary effort is normal  No respiratory distress  Abdominal:      Palpations: Abdomen is soft  Tenderness: There is no abdominal tenderness  Musculoskeletal: Normal range of motion  Skin:     Coloration: Skin is not pale  Findings: Rash (scattered patches of erythema on both arms, leg, no significant surrounding erythema) present  No erythema  Neurological:      Mental Status: He is alert and oriented to person, place, and time  Cranial Nerves: No cranial nerve deficit  Coordination: Coordination normal    Psychiatric:         Behavior: Behavior normal          Thought Content:  Thought content normal          Judgment: Judgment normal           I spent 25 minutes with patient today in which greater than 50% of the time was spent in counseling/coordination of care regarding acute problem      VIRTUAL VISIT [facility] acknowledges that he has consented to an online visit or consultation  He understands that the online visit is based solely on information provided by him, and that, in the absence of a face-to-face physical evaluation by the physician, the diagnosis he receives is both limited and provisional in terms of accuracy and completeness  This is not intended to replace a full medical face-to-face evaluation by the physician  Daryl Bumpers understands and accepts these terms

## 2020-08-12 NOTE — ASSESSMENT & PLAN NOTE
Patient still has areas of involvement, no signs of cellulitis  I discussed in detail with patient signs of cellulitis, what to look for, and to call me if these were to develop  He can add some topical hydrocortisone cream over-the-counter, continue with Benadryl evening, and Claritin during the day since it will make an drowsy  Discussed making sure her present is off tools, clothes, and cat    No respiratory distress or tongue swelling

## 2020-08-24 DIAGNOSIS — E55.9 VITAMIN D DEFICIENCY: ICD-10-CM

## 2020-08-24 DIAGNOSIS — E78.2 MIXED HYPERLIPIDEMIA: Primary | ICD-10-CM

## 2020-08-24 DIAGNOSIS — Z12.5 SCREENING FOR PROSTATE CANCER: ICD-10-CM

## 2020-09-05 LAB
25(OH)D3 SERPL-MCNC: 43 NG/ML (ref 30–100)
ALBUMIN SERPL-MCNC: 4.4 G/DL (ref 3.6–5.1)
ALBUMIN/GLOB SERPL: 2.1 (CALC) (ref 1–2.5)
ALP SERPL-CCNC: 73 U/L (ref 35–144)
ALT SERPL-CCNC: 19 U/L (ref 9–46)
AST SERPL-CCNC: 16 U/L (ref 10–35)
BASOPHILS # BLD AUTO: 99 CELLS/UL (ref 0–200)
BASOPHILS NFR BLD AUTO: 1.4 %
BILIRUB SERPL-MCNC: 1 MG/DL (ref 0.2–1.2)
BUN SERPL-MCNC: 15 MG/DL (ref 7–25)
BUN/CREAT SERPL: ABNORMAL (CALC) (ref 6–22)
CALCIUM SERPL-MCNC: 9.8 MG/DL (ref 8.6–10.3)
CHLORIDE SERPL-SCNC: 106 MMOL/L (ref 98–110)
CHOLEST SERPL-MCNC: 210 MG/DL
CHOLEST/HDLC SERPL: 5.8 (CALC)
CO2 SERPL-SCNC: 28 MMOL/L (ref 20–32)
CREAT SERPL-MCNC: 0.96 MG/DL (ref 0.7–1.33)
EOSINOPHIL # BLD AUTO: 241 CELLS/UL (ref 15–500)
EOSINOPHIL NFR BLD AUTO: 3.4 %
ERYTHROCYTE [DISTWIDTH] IN BLOOD BY AUTOMATED COUNT: 17.5 % (ref 11–15)
GLOBULIN SER CALC-MCNC: 2.1 G/DL (CALC) (ref 1.9–3.7)
GLUCOSE SERPL-MCNC: 102 MG/DL (ref 65–99)
HCT VFR BLD AUTO: 39.9 % (ref 38.5–50)
HDLC SERPL-MCNC: 36 MG/DL
HGB BLD-MCNC: 12.1 G/DL (ref 13.2–17.1)
LDLC SERPL CALC-MCNC: 152 MG/DL (CALC)
LYMPHOCYTES # BLD AUTO: 2151 CELLS/UL (ref 850–3900)
LYMPHOCYTES NFR BLD AUTO: 30.3 %
MCH RBC QN AUTO: 20.2 PG (ref 27–33)
MCHC RBC AUTO-ENTMCNC: 30.3 G/DL (ref 32–36)
MCV RBC AUTO: 66.5 FL (ref 80–100)
MONOCYTES # BLD AUTO: 660 CELLS/UL (ref 200–950)
MONOCYTES NFR BLD AUTO: 9.3 %
NEUTROPHILS # BLD AUTO: 3948 CELLS/UL (ref 1500–7800)
NEUTROPHILS NFR BLD AUTO: 55.6 %
NONHDLC SERPL-MCNC: 174 MG/DL (CALC)
PLATELET # BLD AUTO: 386 THOUSAND/UL (ref 140–400)
PMV BLD REES-ECKER: 12.2 FL (ref 7.5–12.5)
POTASSIUM SERPL-SCNC: 4.8 MMOL/L (ref 3.5–5.3)
PROT SERPL-MCNC: 6.5 G/DL (ref 6.1–8.1)
PSA SERPL-MCNC: 1.5 NG/ML
RBC # BLD AUTO: 6 MILLION/UL (ref 4.2–5.8)
SL AMB EGFR AFRICAN AMERICAN: 103 ML/MIN/1.73M2
SL AMB EGFR NON AFRICAN AMERICAN: 89 ML/MIN/1.73M2
SODIUM SERPL-SCNC: 140 MMOL/L (ref 135–146)
TRIGL SERPL-MCNC: 104 MG/DL
TSH SERPL-ACNC: 1.64 MIU/L (ref 0.4–4.5)
WBC # BLD AUTO: 7.1 THOUSAND/UL (ref 3.8–10.8)

## 2021-07-02 ENCOUNTER — TELEPHONE (OUTPATIENT)
Dept: INTERNAL MEDICINE CLINIC | Facility: CLINIC | Age: 56
End: 2021-07-02

## 2021-07-02 NOTE — TELEPHONE ENCOUNTER
I will assume he has back pain  Treatment for pain this is recommended to start with Tylenol which is available over-the-counter, if no relief with Tylenol and can step up to topical Aspercreme which has lidocaine in it, and if no relief then topical Voltaren which is an anti inflammatory which is also available over-the-counter  Other anti-inflammatories would be Aleve or Motrin which should be taken as needed with food, and stopped when no longer needed  There is no difference between the prescription and the over-the-counter anti-inflammatories

## 2021-07-02 NOTE — TELEPHONE ENCOUNTER
Vandana Garcia (wife) called to adv that Jacinto suggested he contact you for a prescription for an anti inflammatory   They would like it sent to [facility]

## 2021-07-20 ENCOUNTER — TELEPHONE (OUTPATIENT)
Dept: INTERNAL MEDICINE CLINIC | Facility: CLINIC | Age: 56
End: 2021-07-20

## 2021-07-20 DIAGNOSIS — E55.9 VITAMIN D DEFICIENCY: ICD-10-CM

## 2021-07-20 DIAGNOSIS — Z13.0 ENCOUNTER FOR SCREENING FOR DISEASES OF THE BLOOD AND BLOOD-FORMING ORGANS AND CERTAIN DISORDERS INVOLVING THE IMMUNE MECHANISM: Primary | ICD-10-CM

## 2021-07-20 DIAGNOSIS — Z13.29 SCREENING FOR THYROID DISORDER: ICD-10-CM

## 2021-07-20 DIAGNOSIS — Z12.5 SCREENING FOR PROSTATE CANCER: ICD-10-CM

## 2021-07-20 DIAGNOSIS — Z13.1 SCREENING FOR DIABETES MELLITUS: ICD-10-CM

## 2021-07-20 DIAGNOSIS — Z13.220 SCREENING FOR HYPERCHOLESTEROLEMIA: ICD-10-CM

## 2021-07-20 NOTE — TELEPHONE ENCOUNTER
Patient scheduled his yearly wellness in September  Asking if you can order blood work to be done prior to the appointment  Also asking if an order can be entered for a screening colonoscopy          Please advise        **Aware you are out of the office and this can wait til you return**        Please mail orders to patient's Ras address:  [address] [facility], [address] [place] [address]

## 2021-09-07 ENCOUNTER — RA CDI HCC (OUTPATIENT)
Dept: OTHER | Facility: HOSPITAL | Age: 56
End: 2021-09-07

## 2021-09-07 NOTE — PROGRESS NOTES
[facility] 75  coding opportunities       Chart reviewed, no opportunity found: CHART REVIEWED, NO OPPORTUNITY FOUND                        Patients insurance company: [facility] [address]  (Medicare Advantage and Commercial)

## 2021-09-10 LAB
25(OH)D3 SERPL-MCNC: 33 NG/ML (ref 30–100)
ALBUMIN SERPL-MCNC: 4.3 G/DL (ref 3.6–5.1)
ALBUMIN/GLOB SERPL: 2 (CALC) (ref 1–2.5)
ALP SERPL-CCNC: 73 U/L (ref 35–144)
ALT SERPL-CCNC: 14 U/L (ref 9–46)
AST SERPL-CCNC: 14 U/L (ref 10–35)
BASOPHILS # BLD AUTO: 99 CELLS/UL (ref 0–200)
BASOPHILS NFR BLD AUTO: 1.1 %
BILIRUB SERPL-MCNC: 1.1 MG/DL (ref 0.2–1.2)
BUN SERPL-MCNC: 14 MG/DL (ref 7–25)
BUN/CREAT SERPL: NORMAL (CALC) (ref 6–22)
CALCIUM SERPL-MCNC: 9.6 MG/DL (ref 8.6–10.3)
CHLORIDE SERPL-SCNC: 105 MMOL/L (ref 98–110)
CHOLEST SERPL-MCNC: 187 MG/DL
CHOLEST/HDLC SERPL: 4.9 (CALC)
CO2 SERPL-SCNC: 27 MMOL/L (ref 20–32)
CREAT SERPL-MCNC: 0.87 MG/DL (ref 0.7–1.33)
EOSINOPHIL # BLD AUTO: 279 CELLS/UL (ref 15–500)
EOSINOPHIL NFR BLD AUTO: 3.1 %
ERYTHROCYTE [DISTWIDTH] IN BLOOD BY AUTOMATED COUNT: 17.1 % (ref 11–15)
GLOBULIN SER CALC-MCNC: 2.1 G/DL (CALC) (ref 1.9–3.7)
GLUCOSE SERPL-MCNC: 98 MG/DL (ref 65–99)
HCT VFR BLD AUTO: 36.1 % (ref 38.5–50)
HDLC SERPL-MCNC: 38 MG/DL
HGB BLD-MCNC: 11.4 G/DL (ref 13.2–17.1)
LDLC SERPL CALC-MCNC: 127 MG/DL (CALC)
LYMPHOCYTES # BLD AUTO: 2394 CELLS/UL (ref 850–3900)
LYMPHOCYTES NFR BLD AUTO: 26.6 %
MCH RBC QN AUTO: 20.2 PG (ref 27–33)
MCHC RBC AUTO-ENTMCNC: 31.6 G/DL (ref 32–36)
MCV RBC AUTO: 64.1 FL (ref 80–100)
MONOCYTES # BLD AUTO: 720 CELLS/UL (ref 200–950)
MONOCYTES NFR BLD AUTO: 8 %
NEUTROPHILS # BLD AUTO: 5508 CELLS/UL (ref 1500–7800)
NEUTROPHILS NFR BLD AUTO: 61.2 %
NONHDLC SERPL-MCNC: 149 MG/DL (CALC)
PLATELET # BLD AUTO: 396 THOUSAND/UL (ref 140–400)
POTASSIUM SERPL-SCNC: 4.4 MMOL/L (ref 3.5–5.3)
PROT SERPL-MCNC: 6.4 G/DL (ref 6.1–8.1)
PSA SERPL-MCNC: 1.4 NG/ML
RBC # BLD AUTO: 5.63 MILLION/UL (ref 4.2–5.8)
SL AMB EGFR AFRICAN AMERICAN: 112 ML/MIN/1.73M2
SL AMB EGFR NON AFRICAN AMERICAN: 96 ML/MIN/1.73M2
SODIUM SERPL-SCNC: 139 MMOL/L (ref 135–146)
TRIGL SERPL-MCNC: 114 MG/DL
TSH SERPL-ACNC: 2.93 MIU/L (ref 0.4–4.5)
WBC # BLD AUTO: 9 THOUSAND/UL (ref 3.8–10.8)

## 2021-09-13 ENCOUNTER — OFFICE VISIT (OUTPATIENT)
Dept: INTERNAL MEDICINE CLINIC | Facility: CLINIC | Age: 56
End: 2021-09-13
Payer: COMMERCIAL

## 2021-09-13 VITALS
WEIGHT: 212 LBS | OXYGEN SATURATION: 99 % | HEIGHT: 69 IN | BODY MASS INDEX: 31.4 KG/M2 | HEART RATE: 78 BPM | SYSTOLIC BLOOD PRESSURE: 110 MMHG | TEMPERATURE: 97.5 F | DIASTOLIC BLOOD PRESSURE: 70 MMHG

## 2021-09-13 DIAGNOSIS — D64.9 ANEMIA, UNSPECIFIED TYPE: ICD-10-CM

## 2021-09-13 DIAGNOSIS — D56.9 THALASSEMIA, UNSPECIFIED TYPE: ICD-10-CM

## 2021-09-13 DIAGNOSIS — Z00.00 WELLNESS EXAMINATION: Primary | ICD-10-CM

## 2021-09-13 PROCEDURE — 3008F BODY MASS INDEX DOCD: CPT | Performed by: INTERNAL MEDICINE

## 2021-09-13 PROCEDURE — 1036F TOBACCO NON-USER: CPT | Performed by: INTERNAL MEDICINE

## 2021-09-13 PROCEDURE — 3725F SCREEN DEPRESSION PERFORMED: CPT | Performed by: INTERNAL MEDICINE

## 2021-09-13 PROCEDURE — 99396 PREV VISIT EST AGE 40-64: CPT | Performed by: INTERNAL MEDICINE

## 2021-09-13 NOTE — ASSESSMENT & PLAN NOTE
Discussed preventative health, cancer screening, immunizations, and safety issues  Patient wanted to wait till age 54 to get his 1st colonoscopy, he did not get this yet  He wants to get in [place]  Patient had the COVID vaccines  I recommend yearly flu shot  Patient up-to-date with Tdap vaccination  I recommend getting the Shingrix shot to help prevent Shingles  You can get it a pharmacy, and they can administer it there  It is a two shot series with the second shot needed between 2-6 months after the first shot  I would not recommend getting the shot before an important or fun event in case you were to have a reaction to the shot like a sore arm or flu-like symptoms  I make the same recommendation about any shot, as people can have a reaction to any shot

## 2021-09-13 NOTE — PATIENT INSTRUCTIONS
Problem List Items Addressed This Visit        Other    Melony Calderon       Will check to make sure there is not any additional issue going on like mild iron deficiency         Wellness examination - Primary     Discussed preventative health, cancer screening, immunizations, and safety issues  Patient wanted to wait till age 54 to get his 1st colonoscopy, he did not get this yet  He wants to get in [place]  Patient had the COVID vaccines  I recommend yearly flu shot  Patient up-to-date with Tdap vaccination  I recommend getting the Shingrix shot to help prevent Shingles  You can get it a pharmacy, and they can administer it there  It is a two shot series with the second shot needed between 2-6 months after the first shot  I would not recommend getting the shot before an important or fun event in case you were to have a reaction to the shot like a sore arm or flu-like symptoms  I make the same recommendation about any shot, as people can have a reaction to any shot             Other Visit Diagnoses     Anemia, unspecified type        Relevant Orders    Iron, TIBC and Ferritin Panel    Occult Blood, Fecal Immunochemical

## 2021-09-13 NOTE — PROGRESS NOTES
Assessment/Plan:    Wellness examination  Discussed preventative health, cancer screening, immunizations, and safety issues  Patient wanted to wait till age 54 to get his 1st colonoscopy, he did not get this yet  He wants to get in [place]  Patient had the COVID vaccines  I recommend yearly flu shot  Patient up-to-date with Tdap vaccination  I recommend getting the Shingrix shot to help prevent Shingles  You can get it a pharmacy, and they can administer it there  It is a two shot series with the second shot needed between 2-6 months after the first shot  I would not recommend getting the shot before an important or fun event in case you were to have a reaction to the shot like a sore arm or flu-like symptoms  I make the same recommendation about any shot, as people can have a reaction to any shot  Kim Abraham    Will check to make sure there is not any additional issue going on like mild iron deficiency       Diagnoses and all orders for this visit:    Wellness examination    Anemia, unspecified type  -     Iron, TIBC and Ferritin Panel; Future  -     Occult Blood, Fecal Immunochemical; Future    Thalassemia, unspecified type        BMI Counseling: Body mass index is 31 31 kg/m²  The BMI is above normal  Nutrition recommendations include encouraging healthy choices of fruits and vegetables and moderation in carbohydrate intake  Exercise recommendations include exercising 3-5 times per week  Subjective:      Patient ID: Moshe Yu is a 64 y o  male  Wellness:  No smoking cigarettes, patient sees the dentist regularly, patient exercises and tries to eat healthy diet  The following portions of the patient's history were reviewed and updated as appropriate: allergies, current medications, past family history, past medical history, past social history, past surgical history and problem list     Review of Systems   Constitutional: Negative for chills, fatigue and fever     HENT: Negative for congestion, nosebleeds, postnasal drip, sore throat and trouble swallowing  Eyes: Negative for pain  Respiratory: Negative for cough, chest tightness, shortness of breath and wheezing  Cardiovascular: Negative for chest pain, palpitations and leg swelling  Gastrointestinal: Negative for abdominal pain, constipation, diarrhea, nausea and vomiting  Endocrine: Negative for polydipsia and polyuria  Genitourinary: Negative for dysuria, flank pain and hematuria  Musculoskeletal: Positive for back pain  Negative for arthralgias and myalgias  Skin: Negative for rash  Neurological: Negative for dizziness, tremors, light-headedness and headaches  Hematological: Does not bruise/bleed easily  Psychiatric/Behavioral: Negative for confusion and dysphoric mood  The patient is not nervous/anxious  Objective:      /70   Pulse 78   Temp 97 5 °F (36 4 °C) (Temporal)   Ht 5' 9" (1 753 m)   Wt 96 2 kg (212 lb)   SpO2 99%   BMI 31 31 kg/m²          Physical Exam  Vitals reviewed  Constitutional:       General: He is not in acute distress  Appearance: Normal appearance  He is well-developed  HENT:      Head: Normocephalic and atraumatic  Right Ear: External ear normal       Left Ear: External ear normal       Nose: Nose normal    Eyes:      General: No scleral icterus  Conjunctiva/sclera: Conjunctivae normal    Neck:      Thyroid: No thyromegaly  Trachea: No tracheal deviation  Cardiovascular:      Rate and Rhythm: Normal rate and regular rhythm  Heart sounds: Normal heart sounds  No murmur heard  Pulmonary:      Effort: Pulmonary effort is normal  No respiratory distress  Breath sounds: Normal breath sounds  No wheezing or rales  Abdominal:      General: Bowel sounds are normal       Palpations: Abdomen is soft  Tenderness: There is no abdominal tenderness  There is no guarding or rebound        Hernia: There is no hernia in the left inguinal area or right inguinal area  Genitourinary:     Testes: Normal    Musculoskeletal:      Cervical back: Normal range of motion and neck supple  Right lower leg: No edema  Left lower leg: No edema  Lymphadenopathy:      Cervical: No cervical adenopathy  Skin:     Coloration: Skin is not jaundiced or pale  Neurological:      General: No focal deficit present  Mental Status: He is alert and oriented to person, place, and time  Psychiatric:         Mood and Affect: Mood normal          Behavior: Behavior normal          Thought Content:  Thought content normal          Judgment: Judgment normal

## 2021-09-15 LAB
FERRITIN SERPL-MCNC: 247 NG/ML (ref 38–380)
IRON SATN MFR SERPL: 39 % (CALC) (ref 20–48)
IRON SERPL-MCNC: 134 MCG/DL (ref 50–180)
TIBC SERPL-MCNC: 340 MCG/DL (CALC) (ref 250–425)

## 2022-08-17 ENCOUNTER — TELEPHONE (OUTPATIENT)
Dept: INTERNAL MEDICINE CLINIC | Facility: CLINIC | Age: 57
End: 2022-08-17

## 2022-08-17 DIAGNOSIS — E78.00 HYPERCHOLESTEREMIA: ICD-10-CM

## 2022-08-17 DIAGNOSIS — D64.9 ANEMIA, UNSPECIFIED TYPE: Primary | ICD-10-CM

## 2022-08-17 DIAGNOSIS — Z12.5 SCREENING FOR PROSTATE CANCER: ICD-10-CM

## 2022-08-17 DIAGNOSIS — E55.9 VITAMIN D DEFICIENCY: ICD-10-CM

## 2022-08-17 NOTE — TELEPHONE ENCOUNTER
Patient is scheduled for a wellness and asking for blood work to be done prior to the appointment        Please advise      **Please mail script to [place] address**  [id number], [address]

## 2022-09-15 ENCOUNTER — RA CDI HCC (OUTPATIENT)
Dept: OTHER | Facility: HOSPITAL | Age: 57
End: 2022-09-15

## 2022-09-15 NOTE — PROGRESS NOTES
[facility] 75  coding opportunities       Chart reviewed, no opportunity found: CHART REVIEWED, NO OPPORTUNITY FOUND        Patients Insurance        Commercial Insurance: [employer]

## 2022-09-17 LAB
25(OH)D3 SERPL-MCNC: 38 NG/ML (ref 30–100)
ALBUMIN SERPL-MCNC: 4.6 G/DL (ref 3.6–5.1)
ALBUMIN/GLOB SERPL: 2.2 (CALC) (ref 1–2.5)
ALP SERPL-CCNC: 80 U/L (ref 35–144)
ALT SERPL-CCNC: 14 U/L (ref 9–46)
AST SERPL-CCNC: 15 U/L (ref 10–35)
BASOPHILS # BLD AUTO: 48 CELLS/UL (ref 0–200)
BASOPHILS NFR BLD AUTO: 0.7 %
BILIRUB SERPL-MCNC: 1.3 MG/DL (ref 0.2–1.2)
BUN SERPL-MCNC: 17 MG/DL (ref 7–25)
BUN/CREAT SERPL: ABNORMAL (CALC) (ref 6–22)
CALCIUM SERPL-MCNC: 10 MG/DL (ref 8.6–10.3)
CHLORIDE SERPL-SCNC: 104 MMOL/L (ref 98–110)
CHOLEST SERPL-MCNC: 210 MG/DL
CHOLEST/HDLC SERPL: 6 (CALC)
CO2 SERPL-SCNC: 25 MMOL/L (ref 20–32)
CREAT SERPL-MCNC: 1.05 MG/DL (ref 0.7–1.3)
EOSINOPHIL # BLD AUTO: 122 CELLS/UL (ref 15–500)
EOSINOPHIL NFR BLD AUTO: 1.8 %
ERYTHROCYTE [DISTWIDTH] IN BLOOD BY AUTOMATED COUNT: 16.9 % (ref 11–15)
GFR/BSA.PRED SERPLBLD CYS-BASED-ARV: 83 ML/MIN/1.73M2
GLOBULIN SER CALC-MCNC: 2.1 G/DL (CALC) (ref 1.9–3.7)
GLUCOSE SERPL-MCNC: 102 MG/DL (ref 65–99)
HCT VFR BLD AUTO: 39.5 % (ref 38.5–50)
HDLC SERPL-MCNC: 35 MG/DL
HGB BLD-MCNC: 12.2 G/DL (ref 13.2–17.1)
IRON SATN MFR SERPL: 44 % (CALC) (ref 20–48)
IRON SERPL-MCNC: 144 MCG/DL (ref 50–180)
LDLC SERPL CALC-MCNC: 153 MG/DL (CALC)
LYMPHOCYTES # BLD AUTO: 1673 CELLS/UL (ref 850–3900)
LYMPHOCYTES NFR BLD AUTO: 24.6 %
MCH RBC QN AUTO: 20.2 PG (ref 27–33)
MCHC RBC AUTO-ENTMCNC: 30.9 G/DL (ref 32–36)
MCV RBC AUTO: 65.5 FL (ref 80–100)
MONOCYTES # BLD AUTO: 585 CELLS/UL (ref 200–950)
MONOCYTES NFR BLD AUTO: 8.6 %
NEUTROPHILS # BLD AUTO: 4372 CELLS/UL (ref 1500–7800)
NEUTROPHILS NFR BLD AUTO: 64.3 %
NONHDLC SERPL-MCNC: 175 MG/DL (CALC)
PLATELET # BLD AUTO: 371 THOUSAND/UL (ref 140–400)
POTASSIUM SERPL-SCNC: 4.7 MMOL/L (ref 3.5–5.3)
PROT SERPL-MCNC: 6.7 G/DL (ref 6.1–8.1)
PSA SERPL-MCNC: 1.05 NG/ML
RBC # BLD AUTO: 6.03 MILLION/UL (ref 4.2–5.8)
SODIUM SERPL-SCNC: 138 MMOL/L (ref 135–146)
TIBC SERPL-MCNC: 328 MCG/DL (CALC) (ref 250–425)
TRIGL SERPL-MCNC: 102 MG/DL
TSH SERPL-ACNC: 2.4 MIU/L (ref 0.4–4.5)
WBC # BLD AUTO: 6.8 THOUSAND/UL (ref 3.8–10.8)

## 2022-09-21 ENCOUNTER — OFFICE VISIT (OUTPATIENT)
Dept: INTERNAL MEDICINE CLINIC | Facility: CLINIC | Age: 57
End: 2022-09-21
Payer: COMMERCIAL

## 2022-09-21 VITALS
OXYGEN SATURATION: 99 % | HEART RATE: 74 BPM | SYSTOLIC BLOOD PRESSURE: 108 MMHG | WEIGHT: 218 LBS | DIASTOLIC BLOOD PRESSURE: 70 MMHG | HEIGHT: 69 IN | BODY MASS INDEX: 32.29 KG/M2

## 2022-09-21 DIAGNOSIS — Z00.00 WELLNESS EXAMINATION: Primary | ICD-10-CM

## 2022-09-21 DIAGNOSIS — Z23 ENCOUNTER FOR IMMUNIZATION: ICD-10-CM

## 2022-09-21 DIAGNOSIS — Z11.4 SCREENING FOR HIV (HUMAN IMMUNODEFICIENCY VIRUS): ICD-10-CM

## 2022-09-21 DIAGNOSIS — D56.9 THALASSEMIA, UNSPECIFIED TYPE: ICD-10-CM

## 2022-09-21 PROBLEM — L23.7 POISON SUMAC: Status: RESOLVED | Noted: 2020-08-12 | Resolved: 2022-09-21

## 2022-09-21 PROCEDURE — 3725F SCREEN DEPRESSION PERFORMED: CPT | Performed by: INTERNAL MEDICINE

## 2022-09-21 PROCEDURE — 99396 PREV VISIT EST AGE 40-64: CPT | Performed by: INTERNAL MEDICINE

## 2022-09-21 NOTE — PROGRESS NOTES
Name: Gege Dobbins      : 1965      MRN: [id number]  Encounter Provider: Charley Hill MD  Encounter Date: 2022   [facility] [address]     1  Wellness examination  Assessment & Plan:   Discussed preventative health, cancer screening, immunizations, and safety issues  Patient has not had a colonoscopy yet, he wanted to wait till he was age 54  Patient reports he is going to get this done in [place], I asked that he have the results sent here so we can get the electronic health record updated  All labs reviewed with patient patient up-to-date with Tdap vaccination   I recommend getting the Shingrix shot to help prevent Shingles  You can get it a pharmacy, and they can administer it there  It is a two shot series with the second shot needed between 2-6 months after the first shot  I would not recommend getting the shot before an important or fun event in case you were to have a reaction to the shot like a sore arm or flu-like symptoms  I make the same recommendation about any shot, as people can have a reaction to any shot  2  Encounter for immunization    3  Thalassemia, unspecified type  Assessment & Plan:    Stable      4  Screening for HIV (human immunodeficiency virus)  -     HIV 1/2 Antigen/Antibody (4th Generation) w Reflex [facility]; Future      BMI Counseling: Body mass index is 32 19 kg/m²  The BMI is above normal  Nutrition recommendations include encouraging healthy choices of fruits and vegetables and moderation in carbohydrate intake  Exercise recommendations include exercising 3-5 times per week  Rationale for BMI follow-up plan is due to patient being overweight or obese  Depression Screening and Follow-up Plan: Patient was screened for depression during today's encounter  They screened negative with a PHQ-2 score of 0          Subjective     Wellness:  Patient eats a healthy diet, exercises, no smoking cigarettes, sees a dentist regularly    Review of Systems   Constitutional: Negative for chills, fatigue and fever  HENT: Negative for congestion, nosebleeds, postnasal drip, sore throat and trouble swallowing  Eyes: Negative for pain  Respiratory: Negative for cough, chest tightness, shortness of breath and wheezing  Cardiovascular: Negative for chest pain, palpitations and leg swelling  Gastrointestinal: Negative for abdominal pain, constipation, diarrhea, nausea and vomiting  Endocrine: Negative for polydipsia and polyuria  Genitourinary: Negative for dysuria, flank pain and hematuria  Musculoskeletal: Negative for arthralgias and myalgias  Skin: Negative for rash  Neurological: Negative for dizziness, tremors, light-headedness and headaches  Hematological: Does not bruise/bleed easily  Psychiatric/Behavioral: Negative for confusion and dysphoric mood  The patient is not nervous/anxious          Past Medical History:   Diagnosis Date    Medical history unknown     Poison sumac 8/12/2020     Past Surgical History:   Procedure Laterality Date    APPENDECTOMY      TONSILLECTOMY AND ADENOIDECTOMY       Family History   Problem Relation Age of Onset    Hyperlipidemia Mother     Hypertension Mother     Hyperlipidemia Father      Social History     Socioeconomic History    Marital status: /Civil Union     Spouse name: None    Number of children: None    Years of education: None    Highest education level: None   Occupational History    None   Tobacco Use    Smoking status: Never Smoker    Smokeless tobacco: Never Used   Substance and Sexual Activity    Alcohol use: Yes     Comment: Being A Social Drinker     Drug use: No    Sexual activity: None   Other Topics Concern    None   Social History Narrative    None     Social Determinants of Health     Financial Resource Strain: Not on file   Food Insecurity: Not on file   Transportation Needs: Not on file   Physical Activity: Not on file   Stress: Not on file   Social Connections: Not on file   Intimate Partner Violence: Not on file   Housing Stability: Not on file     Current Outpatient Medications on File Prior to Visit   Medication Sig    b complex vitamins capsule Take 1 capsule by mouth daily    DAILY MULTIPLE VITAMINS/IRON PO Take 1 tablet by mouth daily    Omega-3 Fatty Acids (FISH OIL) 1,000 mg Take 1 capsule by mouth daily    b complex vitamins capsule Take 1 capsule by mouth daily    Calcium Carbonate (CALTRATE 600 PO) Take by mouth    Glucosamine-Chondroitin 6412-8351 MG/30ML LIQD Take 1 tablet by mouth daily    Omega-3 Fatty Acids (FISH OIL) 1,000 mg Take 1 capsule by mouth daily     No Known Allergies  Immunization History   Administered Date(s) Administered    Influenza, injectable, quadrivalent, preservative free 0 5 mL 09/20/2018    Tdap 09/06/2019       Objective     /70   Pulse 74   Ht 5' 9" (1 753 m)   Wt 98 9 kg (218 lb)   SpO2 99%   BMI 32 19 kg/m²     Physical Exam  Vitals reviewed  Constitutional:       General: He is not in acute distress  Appearance: Normal appearance  He is well-developed  HENT:      Head: Normocephalic and atraumatic  Right Ear: External ear normal       Left Ear: External ear normal       Nose: Nose normal    Eyes:      General: No scleral icterus  Conjunctiva/sclera: Conjunctivae normal    Neck:      Thyroid: No thyromegaly  Trachea: No tracheal deviation  Cardiovascular:      Rate and Rhythm: Normal rate and regular rhythm  Heart sounds: Normal heart sounds  No murmur heard  Pulmonary:      Effort: Pulmonary effort is normal  No respiratory distress  Breath sounds: Normal breath sounds  No wheezing or rales  Abdominal:      General: Bowel sounds are normal       Palpations: Abdomen is soft  Tenderness: There is no abdominal tenderness  There is no guarding or rebound        Hernia: There is no hernia in the left inguinal area or right inguinal area  Genitourinary:     Testes: Normal    Musculoskeletal:      Cervical back: Normal range of motion and neck supple  Right lower leg: No edema  Left lower leg: No edema  Lymphadenopathy:      Cervical: No cervical adenopathy  Skin:     Coloration: Skin is not jaundiced or pale  Neurological:      Mental Status: He is alert and oriented to person, place, and time  Psychiatric:         Mood and Affect: Mood normal          Behavior: Behavior normal          Thought Content:  Thought content normal          Judgment: Judgment normal        Inge Torres MD

## 2022-09-21 NOTE — PATIENT INSTRUCTIONS
Problem List Items Addressed This Visit          Other    Thalassanemia       Stable           Wellness examination - Primary      Discussed preventative health, cancer screening, immunizations, and safety issues  Patient has not had a colonoscopy yet, he wanted to wait till he was age 54  Patient reports he is going to get this done in [place], I asked that he have the results sent here so we can get the electronic health record updated  All labs reviewed with patient patient up-to-date with Tdap vaccination 2019  I recommend getting the Shingrix shot to help prevent Shingles  You can get it a pharmacy, and they can administer it there  It is a two shot series with the second shot needed between 2-6 months after the first shot  I would not recommend getting the shot before an important or fun event in case you were to have a reaction to the shot like a sore arm or flu-like symptoms  I make the same recommendation about any shot, as people can have a reaction to any shot                   Other Visit Diagnoses       Encounter for immunization        Screening for HIV (human immunodeficiency virus)        Relevant Orders    HIV 1/2 Antigen/Antibody (4th Generation) w Reflex [facility]

## 2022-09-21 NOTE — ASSESSMENT & PLAN NOTE
Discussed preventative health, cancer screening, immunizations, and safety issues  Patient has not had a colonoscopy yet, he wanted to wait till he was age 54  Patient reports he is going to get this done in [place], I asked that he have the results sent here so we can get the electronic health record updated  All labs reviewed with patient patient up-to-date with Tdap vaccination 2019  I recommend getting the Shingrix shot to help prevent Shingles  You can get it a pharmacy, and they can administer it there  It is a two shot series with the second shot needed between 2-6 months after the first shot  I would not recommend getting the shot before an important or fun event in case you were to have a reaction to the shot like a sore arm or flu-like symptoms  I make the same recommendation about any shot, as people can have a reaction to any shot

## 2023-07-20 ENCOUNTER — TELEPHONE (OUTPATIENT)
Dept: INTERNAL MEDICINE CLINIC | Facility: CLINIC | Age: 58
End: 2023-07-20

## 2023-07-21 DIAGNOSIS — R73.01 IMPAIRED FASTING GLUCOSE: ICD-10-CM

## 2023-07-21 DIAGNOSIS — E55.9 VITAMIN D DEFICIENCY: ICD-10-CM

## 2023-07-21 DIAGNOSIS — E78.00 HYPERCHOLESTEREMIA: Primary | ICD-10-CM

## 2023-07-21 DIAGNOSIS — Z12.5 SCREENING FOR PROSTATE CANCER: ICD-10-CM

## 2023-09-25 ENCOUNTER — RA CDI HCC (OUTPATIENT)
Dept: OTHER | Facility: HOSPITAL | Age: 58
End: 2023-09-25

## 2023-09-25 NOTE — PROGRESS NOTES
[address] [facility] coding opportunities       Chart reviewed, no opportunity found: CHART REVIEWED, NO OPPORTUNITY FOUND        Patients Insurance        Commercial Insurance: [employer]

## 2023-09-26 LAB
25(OH)D3 SERPL-MCNC: 37 NG/ML (ref 30–100)
ALBUMIN SERPL-MCNC: 4.3 G/DL (ref 3.6–5.1)
ALBUMIN/GLOB SERPL: 2 (CALC) (ref 1–2.5)
ALP SERPL-CCNC: 76 U/L (ref 35–144)
ALT SERPL-CCNC: 9 U/L (ref 9–46)
AST SERPL-CCNC: 12 U/L (ref 10–35)
BASOPHILS # BLD AUTO: 72 CELLS/UL (ref 0–200)
BASOPHILS NFR BLD AUTO: 0.9 %
BILIRUB SERPL-MCNC: 1.5 MG/DL (ref 0.2–1.2)
BUN SERPL-MCNC: 15 MG/DL (ref 7–25)
BUN/CREAT SERPL: ABNORMAL (CALC) (ref 6–22)
CALCIUM SERPL-MCNC: 9.5 MG/DL (ref 8.6–10.3)
CHLORIDE SERPL-SCNC: 107 MMOL/L (ref 98–110)
CHOLEST SERPL-MCNC: 182 MG/DL
CHOLEST/HDLC SERPL: 4.8 (CALC)
CO2 SERPL-SCNC: 25 MMOL/L (ref 20–32)
CREAT SERPL-MCNC: 0.82 MG/DL (ref 0.7–1.3)
EOSINOPHIL # BLD AUTO: 208 CELLS/UL (ref 15–500)
EOSINOPHIL NFR BLD AUTO: 2.6 %
ERYTHROCYTE [DISTWIDTH] IN BLOOD BY AUTOMATED COUNT: 16.1 % (ref 11–15)
GFR/BSA.PRED SERPLBLD CYS-BASED-ARV: 102 ML/MIN/1.73M2
GLOBULIN SER CALC-MCNC: 2.2 G/DL (CALC) (ref 1.9–3.7)
GLUCOSE SERPL-MCNC: 97 MG/DL (ref 65–99)
HBA1C MFR BLD: 5.2 % OF TOTAL HGB
HCT VFR BLD AUTO: 37 % (ref 38.5–50)
HDLC SERPL-MCNC: 38 MG/DL
HGB BLD-MCNC: 11.7 G/DL (ref 13.2–17.1)
LDLC SERPL CALC-MCNC: 123 MG/DL (CALC)
LYMPHOCYTES # BLD AUTO: 2288 CELLS/UL (ref 850–3900)
LYMPHOCYTES NFR BLD AUTO: 28.6 %
MCH RBC QN AUTO: 20.4 PG (ref 27–33)
MCHC RBC AUTO-ENTMCNC: 31.6 G/DL (ref 32–36)
MCV RBC AUTO: 64.5 FL (ref 80–100)
MONOCYTES # BLD AUTO: 680 CELLS/UL (ref 200–950)
MONOCYTES NFR BLD AUTO: 8.5 %
NEUTROPHILS # BLD AUTO: 4752 CELLS/UL (ref 1500–7800)
NEUTROPHILS NFR BLD AUTO: 59.4 %
NONHDLC SERPL-MCNC: 144 MG/DL (CALC)
PLATELET # BLD AUTO: 317 THOUSAND/UL (ref 140–400)
POTASSIUM SERPL-SCNC: 4.1 MMOL/L (ref 3.5–5.3)
PROT SERPL-MCNC: 6.5 G/DL (ref 6.1–8.1)
PSA SERPL-MCNC: 1.04 NG/ML
RBC # BLD AUTO: 5.74 MILLION/UL (ref 4.2–5.8)
SODIUM SERPL-SCNC: 139 MMOL/L (ref 135–146)
TRIGL SERPL-MCNC: 103 MG/DL
TSH SERPL-ACNC: 2.11 MIU/L (ref 0.4–4.5)
WBC # BLD AUTO: 8 THOUSAND/UL (ref 3.8–10.8)

## 2023-09-27 RX ORDER — VITAMIN B COMPLEX
TABLET ORAL
COMMUNITY

## 2023-09-29 ENCOUNTER — OFFICE VISIT (OUTPATIENT)
Dept: INTERNAL MEDICINE CLINIC | Facility: CLINIC | Age: 58
End: 2023-09-29
Payer: COMMERCIAL

## 2023-09-29 VITALS
HEIGHT: 69 IN | WEIGHT: 218 LBS | HEART RATE: 53 BPM | DIASTOLIC BLOOD PRESSURE: 82 MMHG | BODY MASS INDEX: 32.29 KG/M2 | SYSTOLIC BLOOD PRESSURE: 118 MMHG | OXYGEN SATURATION: 99 %

## 2023-09-29 DIAGNOSIS — Z12.11 SCREENING FOR COLON CANCER: Primary | ICD-10-CM

## 2023-09-29 DIAGNOSIS — Z00.00 WELLNESS EXAMINATION: ICD-10-CM

## 2023-09-29 PROCEDURE — 99396 PREV VISIT EST AGE 40-64: CPT | Performed by: INTERNAL MEDICINE

## 2023-09-29 NOTE — ASSESSMENT & PLAN NOTE
Discussed preventative health, cancer screening, immunizations, and safety issues. Patient has not had a colonoscopy yet. In the past he wanted to wait till age 54 to get the colonoscopy and wanted to get this done in [place], I reminded patient about importance of colorectal cancer screening. Since he has not had a colonoscopy yet, I discussed Cologuard as an option. Patient had Tdap vaccination 2019. I recommend yearly flu shot. All labs reviewed with patient. Discussed BMI with patient. I recommend getting the Shingrix shot to help prevent Shingles. You can get it a pharmacy, and they can administer it there. It is a two shot series with the second shot needed between 2-6 months after the first shot. I would not recommend getting the shot before an important or fun event in case you were to have a reaction to the shot like a sore arm or flu-like symptoms. I make the same recommendation about any shot, as people can have a reaction to any shot.

## 2023-09-29 NOTE — PATIENT INSTRUCTIONS
Problem List Items Addressed This Visit          Other    Wellness examination     Discussed preventative health, cancer screening, immunizations, and safety issues. Patient has not had a colonoscopy yet. In the past he wanted to wait till age 54 to get the colonoscopy and wanted to get this done in [place], I reminded patient about importance of colorectal cancer screening. Since he has not had a colonoscopy yet, I discussed Cologuard as an option. Patient had Tdap vaccination 2019. I recommend yearly flu shot. All labs reviewed with patient. Discussed BMI with patient. I recommend getting the Shingrix shot to help prevent Shingles. You can get it a pharmacy, and they can administer it there. It is a two shot series with the second shot needed between 2-6 months after the first shot. I would not recommend getting the shot before an important or fun event in case you were to have a reaction to the shot like a sore arm or flu-like symptoms. I make the same recommendation about any shot, as people can have a reaction to any shot.           Other Visit Diagnoses       Screening for colon cancer    -  Primary    Relevant Orders    Cologuard

## 2023-09-29 NOTE — PROGRESS NOTES
Name: Mukul Kennedy      : 1965      MRN: [id number]  Encounter Provider: Brandon Babb MD  Encounter Date: 2023   [facility]     1. Screening for colon cancer  -     Cologuard    2. Wellness examination  Assessment & Plan:  Discussed preventative health, cancer screening, immunizations, and safety issues. Patient has not had a colonoscopy yet. In the past he wanted to wait till age 54 to get the colonoscopy and wanted to get this done in [place], I reminded patient about importance of colorectal cancer screening. Since he has not had a colonoscopy yet, I discussed Cologuard as an option. Patient had Tdap vaccination 2019. I recommend yearly flu shot. All labs reviewed with patient. Discussed BMI with patient. I recommend getting the Shingrix shot to help prevent Shingles. You can get it a pharmacy, and they can administer it there. It is a two shot series with the second shot needed between 2-6 months after the first shot. I would not recommend getting the shot before an important or fun event in case you were to have a reaction to the shot like a sore arm or flu-like symptoms. I make the same recommendation about any shot, as people can have a reaction to any shot. Depression Screening and Follow-up Plan: Patient was screened for depression during today's encounter. They screened negative with a PHQ-2 score of 0. Subjective     Wellness: Patient gets routine dental care, no smoking cigarettes, eats a healthy diet, exercises. Review of Systems   Constitutional: Negative for chills, fatigue and fever. HENT: Negative for congestion, nosebleeds, postnasal drip, sore throat and trouble swallowing. Eyes: Negative for pain. Respiratory: Negative for cough, chest tightness, shortness of breath and wheezing. Cardiovascular: Negative for chest pain, palpitations and leg swelling.    Gastrointestinal: Negative for abdominal pain, constipation, diarrhea, nausea and vomiting. Endocrine: Negative for polydipsia and polyuria. Genitourinary: Negative for dysuria, flank pain and hematuria. Musculoskeletal: Negative for arthralgias, back pain and myalgias. Skin: Negative for rash. Neurological: Negative for dizziness, tremors, light-headedness and headaches. Hematological: Does not bruise/bleed easily. Psychiatric/Behavioral: Negative for confusion and dysphoric mood. The patient is not nervous/anxious.         Past Medical History:   Diagnosis Date   • Medical history unknown    • Poison sumac 08/12/2020   • Shingles 2015     Past Surgical History:   Procedure Laterality Date   • APPENDECTOMY     • ROTATOR CUFF REPAIR Left 05/2022   • TONSILLECTOMY AND ADENOIDECTOMY       Family History   Problem Relation Age of Onset   • Hyperlipidemia Mother    • Hypertension Mother    • Hyperlipidemia Father    • Heart disease Father    • Glaucoma Maternal Grandmother    • Heart disease Paternal Grandfather    • Arthritis Paternal Grandmother    • Heart disease Paternal Uncle    • Thyroid disease Sister    • Breast cancer Paternal Aunt    • Glaucoma Paternal Aunt    • Cancer Daughter    • Glaucoma Maternal Aunt      Social History     Socioeconomic History   • Marital status: /Civil Union     Spouse name: None   • Number of children: None   • Years of education: None   • Highest education level: None   Occupational History   • None   Tobacco Use   • Smoking status: Never   • Smokeless tobacco: Never   Vaping Use   • Vaping Use: Never used   Substance and Sexual Activity   • Alcohol use: Yes     Comment: social   • Drug use: No   • Sexual activity: Yes     Partners: Female   Other Topics Concern   • None   Social History Narrative   • None     Social Determinants of Health     Financial Resource Strain: Not on file   Food Insecurity: Not on file   Transportation Needs: Not on file   Physical Activity: Not on file Stress: Not on file   Social Connections: Not on file   Intimate Partner Violence: Not on file   Housing Stability: Not on file     Current Outpatient Medications on File Prior to Visit   Medication Sig   • b complex vitamins capsule Take 1 capsule by mouth daily   • cholecalciferol (VITAMIN D3) 25 mcg (1,000 units) tablet    • DAILY MULTIPLE VITAMINS/IRON PO Take 1 tablet by mouth daily   • Omega-3 Fatty Acids (FISH OIL) 1,000 mg Take 1 capsule by mouth daily     No Known Allergies  Immunization History   Administered Date(s) Administered   • INFLUENZA 10/15/2021   • Influenza, injectable, quadrivalent, preservative free 0.5 mL 09/20/2018   • Tdap 09/06/2019       Objective     /82 (BP Location: Left arm, Patient Position: Sitting, Cuff Size: Standard)   Pulse (!) 53   Ht 5' 9" (1.753 m)   Wt 98.9 kg (218 lb)   SpO2 99%   BMI 32.19 kg/m²     Physical Exam  Vitals reviewed. Constitutional:       General: He is not in acute distress. Appearance: Normal appearance. He is well-developed. HENT:      Head: Normocephalic and atraumatic. Right Ear: External ear normal.      Left Ear: External ear normal.   Eyes:      General: No scleral icterus. Conjunctiva/sclera: Conjunctivae normal.   Neck:      Thyroid: No thyromegaly. Trachea: No tracheal deviation. Cardiovascular:      Rate and Rhythm: Normal rate and regular rhythm. Heart sounds: Normal heart sounds. No murmur heard. Pulmonary:      Effort: Pulmonary effort is normal. No respiratory distress. Breath sounds: Normal breath sounds. No wheezing or rales. Abdominal:      General: Bowel sounds are normal.      Palpations: Abdomen is soft. Tenderness: There is no abdominal tenderness. There is no guarding or rebound. Hernia: There is no hernia in the left inguinal area or right inguinal area. Genitourinary:     Testes: Normal.   Musculoskeletal:      Cervical back: Normal range of motion and neck supple. Right lower leg: No edema. Left lower leg: No edema. Lymphadenopathy:      Cervical: No cervical adenopathy. Skin:     Coloration: Skin is not jaundiced or pale. Neurological:      General: No focal deficit present. Mental Status: He is alert and oriented to person, place, and time. Psychiatric:         Mood and Affect: Mood normal.         Behavior: Behavior normal.         Thought Content:  Thought content normal.         Judgment: Judgment normal.       Jody Kerr MD

## 2023-10-02 DIAGNOSIS — Z12.11 SCREENING FOR COLON CANCER: Primary | ICD-10-CM

## 2024-08-05 ENCOUNTER — TELEPHONE (OUTPATIENT)
Age: 59
End: 2024-08-05

## 2024-08-05 DIAGNOSIS — E55.9 VITAMIN D DEFICIENCY: ICD-10-CM

## 2024-08-05 DIAGNOSIS — D64.9 ANEMIA, UNSPECIFIED TYPE: ICD-10-CM

## 2024-08-05 DIAGNOSIS — R73.01 IMPAIRED FASTING GLUCOSE: ICD-10-CM

## 2024-08-05 DIAGNOSIS — Z12.5 SCREENING FOR PROSTATE CANCER: ICD-10-CM

## 2024-08-05 DIAGNOSIS — E78.00 HYPERCHOLESTEREMIA: Primary | ICD-10-CM

## 2024-08-05 DIAGNOSIS — Z12.11 SCREENING FOR COLON CANCER: ICD-10-CM

## 2024-08-05 NOTE — TELEPHONE ENCOUNTER
Wife  is asking for routine labs to do for pt's upcoming appt mailed to her [place] address. [address], [place] [address]. Thank you.

## 2024-08-28 ENCOUNTER — NURSE TRIAGE (OUTPATIENT)
Age: 59
End: 2024-08-28

## 2024-08-28 DIAGNOSIS — L23.7 POISON IVY: Primary | ICD-10-CM

## 2024-08-28 RX ORDER — METHYLPREDNISOLONE 4 MG
TABLET, DOSE PACK ORAL
Qty: 21 EACH | Refills: 0 | Status: SHIPPED | OUTPATIENT
Start: 2024-08-28

## 2024-08-28 NOTE — TELEPHONE ENCOUNTER
"Patient's spouse Diana called in to report patient with poison ivy rash on bilateral lower arms with moderate itching post outdoor work Sunday 8/25. Requesting order for steroids be sent to [facility] on [address]. Please follow up with patient if OV required or PCP will send in order.     Next OV is physical scheduled 9/18.    Reason for Disposition   Mild rash from poison ivy, oak or sumac    Answer Assessment - Initial Assessment Questions  1. APPEARANCE of RASH: \"Describe the rash.\"       Reddened  2. LOCATION: \"Where is the rash located?\"       Bilateral arms  3. SIZE: \"How large is the rash?\"       Lower arms  4. ONSET: \"When did the rash begin?\"       Sunday 8/25  5. ITCHING: \"Does the rash itch?\" If Yes, ask: \"How bad is it?\"    - MILD - doesn't interfere with normal activities    - MODERATE-SEVERE: interferes with work, school, sleep, or other activities       moderate    Protocols used: Poison Ivy - Oak - Sumac-ADULT-[place]    "

## 2024-09-13 LAB
25(OH)D3 SERPL-MCNC: 45 NG/ML (ref 30–100)
ALBUMIN SERPL-MCNC: 4.4 G/DL (ref 3.6–5.1)
ALBUMIN/GLOB SERPL: 1.9 (CALC) (ref 1–2.5)
ALP SERPL-CCNC: 71 U/L (ref 35–144)
ALT SERPL-CCNC: 10 U/L (ref 9–46)
AST SERPL-CCNC: 13 U/L (ref 10–35)
BASOPHILS # BLD AUTO: 61 CELLS/UL (ref 0–200)
BASOPHILS NFR BLD AUTO: 0.8 %
BILIRUB SERPL-MCNC: 1.7 MG/DL (ref 0.2–1.2)
BUN SERPL-MCNC: 16 MG/DL (ref 7–25)
BUN/CREAT SERPL: ABNORMAL (CALC) (ref 6–22)
CALCIUM SERPL-MCNC: 9.8 MG/DL (ref 8.6–10.3)
CHLORIDE SERPL-SCNC: 104 MMOL/L (ref 98–110)
CHOLEST SERPL-MCNC: 198 MG/DL
CHOLEST/HDLC SERPL: 5.4 (CALC)
CO2 SERPL-SCNC: 29 MMOL/L (ref 20–32)
CREAT SERPL-MCNC: 0.82 MG/DL (ref 0.7–1.3)
EOSINOPHIL # BLD AUTO: 144 CELLS/UL (ref 15–500)
EOSINOPHIL NFR BLD AUTO: 1.9 %
ERYTHROCYTE [DISTWIDTH] IN BLOOD BY AUTOMATED COUNT: 15.8 % (ref 11–15)
FERRITIN SERPL-MCNC: 250 NG/ML (ref 38–380)
GFR/BSA.PRED SERPLBLD CYS-BASED-ARV: 101 ML/MIN/1.73M2
GLOBULIN SER CALC-MCNC: 2.3 G/DL (CALC) (ref 1.9–3.7)
GLUCOSE SERPL-MCNC: 97 MG/DL (ref 65–99)
HBA1C MFR BLD: 5.5 % OF TOTAL HGB
HCT VFR BLD AUTO: 39.8 % (ref 38.5–50)
HDLC SERPL-MCNC: 37 MG/DL
HGB BLD-MCNC: 12.4 G/DL (ref 13.2–17.1)
IRON SATN MFR SERPL: 38 % (CALC) (ref 20–48)
IRON SERPL-MCNC: 125 MCG/DL (ref 50–180)
LDLC SERPL CALC-MCNC: 139 MG/DL (CALC)
LYMPHOCYTES # BLD AUTO: 2113 CELLS/UL (ref 850–3900)
LYMPHOCYTES NFR BLD AUTO: 27.8 %
MCH RBC QN AUTO: 21.1 PG (ref 27–33)
MCHC RBC AUTO-ENTMCNC: 31.2 G/DL (ref 32–36)
MCV RBC AUTO: 67.6 FL (ref 80–100)
MONOCYTES # BLD AUTO: 593 CELLS/UL (ref 200–950)
MONOCYTES NFR BLD AUTO: 7.8 %
NEUTROPHILS # BLD AUTO: 4689 CELLS/UL (ref 1500–7800)
NEUTROPHILS NFR BLD AUTO: 61.7 %
NONHDLC SERPL-MCNC: 161 MG/DL (CALC)
PLATELET # BLD AUTO: 343 THOUSAND/UL (ref 140–400)
POTASSIUM SERPL-SCNC: 4.6 MMOL/L (ref 3.5–5.3)
PROT SERPL-MCNC: 6.7 G/DL (ref 6.1–8.1)
PSA SERPL-MCNC: 1.48 NG/ML
RBC # BLD AUTO: 5.89 MILLION/UL (ref 4.2–5.8)
SODIUM SERPL-SCNC: 139 MMOL/L (ref 135–146)
TIBC SERPL-MCNC: 325 MCG/DL (CALC) (ref 250–425)
TRIGL SERPL-MCNC: 108 MG/DL
TSH SERPL-ACNC: 2.03 MIU/L (ref 0.4–4.5)
WBC # BLD AUTO: 7.6 THOUSAND/UL (ref 3.8–10.8)

## 2024-09-18 ENCOUNTER — OFFICE VISIT (OUTPATIENT)
Dept: INTERNAL MEDICINE CLINIC | Facility: CLINIC | Age: 59
End: 2024-09-18
Payer: COMMERCIAL

## 2024-09-18 VITALS
BODY MASS INDEX: 31.25 KG/M2 | TEMPERATURE: 97.8 F | DIASTOLIC BLOOD PRESSURE: 78 MMHG | OXYGEN SATURATION: 97 % | WEIGHT: 211 LBS | HEART RATE: 52 BPM | HEIGHT: 69 IN | SYSTOLIC BLOOD PRESSURE: 120 MMHG

## 2024-09-18 DIAGNOSIS — Z00.00 WELLNESS EXAMINATION: Primary | ICD-10-CM

## 2024-09-18 DIAGNOSIS — Z12.11 SCREENING FOR COLON CANCER: Primary | ICD-10-CM

## 2024-09-18 DIAGNOSIS — K40.90 RIGHT INGUINAL HERNIA: ICD-10-CM

## 2024-09-18 PROCEDURE — 99396 PREV VISIT EST AGE 40-64: CPT | Performed by: INTERNAL MEDICINE

## 2024-09-18 NOTE — PATIENT INSTRUCTIONS
Problem List Items Addressed This Visit          Other    Wellness examination - Primary     Discussed preventative health, cancer screening, immunizations, and safety issues.  I again recommend colonoscopy.  Patient is wanted to wait till age 55, and get this in [place], he just has not done it yet.  He reports he is going to try to get it before the end of the year.  I recommend yearly flu shot.  Patient had Tdap vaccination 2019.    I recommend getting the Shingrix shot to help prevent Shingles.  You can get it a pharmacy, and they can administer it there.  It is a two shot series with the second shot needed between 2-6 months after the first shot.  I would not recommend getting the shot before an important or fun event in case you were to have a reaction to the shot like a sore arm or flu-like symptoms.  I make the same recommendation about any shot, as people can have a reaction to any shot.         Right inguinal hernia     Feels like a hernia on the right, will refer to surgeon, patient not having pain in the area         Relevant Orders    Ambulatory Referral to General Surgery

## 2024-09-18 NOTE — PROGRESS NOTES
Ambulatory Visit  Name: Apolinar Quan      : 1965      MRN: [id number]  Encounter Provider: Dave Collins MD  Encounter Date: 2024   Encounter department: [facility]    Assessment & Plan  Wellness examination  Discussed preventative health, cancer screening, immunizations, and safety issues.  I again recommend colonoscopy.  Patient is wanted to wait till age 55, and get this in [place], he just has not done it yet.  He reports he is going to try to get it before the end of the year.  I recommend yearly flu shot.  Patient had Tdap vaccination 2019.    I recommend getting the Shingrix shot to help prevent Shingles.  You can get it a pharmacy, and they can administer it there.  It is a two shot series with the second shot needed between 2-6 months after the first shot.  I would not recommend getting the shot before an important or fun event in case you were to have a reaction to the shot like a sore arm or flu-like symptoms.  I make the same recommendation about any shot, as people can have a reaction to any shot.         Right inguinal hernia  Feels like a hernia on the right, will refer to surgeon, patient not having pain in the area    Orders:    Ambulatory Referral to General Surgery; Future      Depression Screening and Follow-up Plan: Patient was screened for depression during today's encounter. They screened negative with a PHQ-2 score of 0.        History of Present Illness     Wellness: No smoking cigarettes, eats a healthy diet, exercises, gets routine dental care      Review of Systems   Constitutional:  Negative for chills, fatigue and fever.   HENT:  Negative for congestion, nosebleeds, postnasal drip, sore throat and trouble swallowing.    Eyes:  Negative for pain.   Respiratory:  Negative for cough, chest tightness, shortness of breath and wheezing.    Cardiovascular:  Negative for chest pain, palpitations and leg swelling.   Gastrointestinal:  Negative for  abdominal pain, constipation, diarrhea, nausea and vomiting.   Endocrine: Negative for polydipsia and polyuria.   Genitourinary:  Negative for dysuria, flank pain and hematuria.   Musculoskeletal:  Negative for arthralgias, back pain and myalgias.   Skin:  Negative for rash.   Neurological:  Negative for dizziness, tremors, light-headedness and headaches.   Hematological:  Does not bruise/bleed easily.   Psychiatric/Behavioral:  Negative for confusion and dysphoric mood. The patient is not nervous/anxious.      Past Medical History:   Diagnosis Date    Medical history unknown     Poison sumac 08/12/2020    Shingles 2015     Past Surgical History:   Procedure Laterality Date    APPENDECTOMY      ROTATOR CUFF REPAIR Left 05/2022    TONSILLECTOMY AND ADENOIDECTOMY       Family History   Problem Relation Age of Onset    Hyperlipidemia Mother     Hypertension Mother     Hyperlipidemia Father     Heart disease Father     Glaucoma Maternal Grandmother     Heart disease Paternal Grandfather     Arthritis Paternal Grandmother     Heart disease Paternal Uncle     Thyroid disease Sister     Breast cancer Paternal Aunt     Glaucoma Paternal Aunt     Cancer Daughter     Glaucoma Maternal Aunt      Social History     Tobacco Use    Smoking status: Never    Smokeless tobacco: Never   Vaping Use    Vaping status: Never Used   Substance and Sexual Activity    Alcohol use: Yes     Comment: social    Drug use: No    Sexual activity: Yes     Partners: Female     Current Outpatient Medications on File Prior to Visit   Medication Sig    cholecalciferol (VITAMIN D3) 25 mcg (1,000 units) tablet     Omega-3 Fatty Acids (FISH OIL) 1,000 mg Take 1 capsule by mouth daily    b complex vitamins capsule Take 1 capsule by mouth daily    DAILY MULTIPLE VITAMINS/IRON PO Take 1 tablet by mouth daily (Patient not taking: Reported on 9/18/2024)    methylPREDNISolone 4 MG tablet therapy pack Use as directed on package     No Known  "Allergies  Immunization History   Administered Date(s) Administered    INFLUENZA 10/15/2021    Influenza, injectable, quadrivalent, preservative free 0.5 mL 09/20/2018    Tdap 09/06/2019     Objective     /78 (BP Location: Right arm, Patient Position: Sitting, Cuff Size: Large)   Pulse (!) 52   Temp 97.8 °F (36.6 °C) (Probe)   Ht 5' 9\" (1.753 m)   Wt 95.7 kg (211 lb)   SpO2 97%   BMI 31.16 kg/m²     Physical Exam  Vitals reviewed.   Constitutional:       General: He is not in acute distress.     Appearance: Normal appearance. He is well-developed.   HENT:      Head: Normocephalic and atraumatic.      Right Ear: External ear normal.      Left Ear: External ear normal.      Nose: Nose normal.   Eyes:      General: No scleral icterus.     Conjunctiva/sclera: Conjunctivae normal.   Neck:      Trachea: No tracheal deviation.   Cardiovascular:      Rate and Rhythm: Normal rate and regular rhythm.      Heart sounds: Normal heart sounds. No murmur heard.  Pulmonary:      Effort: Pulmonary effort is normal. No respiratory distress.      Breath sounds: Normal breath sounds. No wheezing or rales.   Abdominal:      General: Bowel sounds are normal.      Palpations: Abdomen is soft. There is no mass.      Tenderness: There is no abdominal tenderness. There is no guarding.      Hernia: A hernia is present. Hernia is present in the right inguinal area. There is no hernia in the left inguinal area.   Genitourinary:     Testes: Normal.   Musculoskeletal:      Cervical back: Normal range of motion and neck supple.      Right lower leg: No edema.      Left lower leg: No edema.   Lymphadenopathy:      Cervical: No cervical adenopathy.   Skin:     Coloration: Skin is not jaundiced or pale.   Neurological:      General: No focal deficit present.      Mental Status: He is alert and oriented to person, place, and time.   Psychiatric:         Mood and Affect: Mood normal.         Behavior: Behavior normal.         Thought " Content: Thought content normal.         Judgment: Judgment normal.

## 2024-09-18 NOTE — ASSESSMENT & PLAN NOTE
Feels like a hernia on the right, will refer to surgeon, patient not having pain in the area    Orders:    Ambulatory Referral to General Surgery; Future